# Patient Record
Sex: FEMALE | Race: WHITE | Employment: FULL TIME | URBAN - METROPOLITAN AREA
[De-identification: names, ages, dates, MRNs, and addresses within clinical notes are randomized per-mention and may not be internally consistent; named-entity substitution may affect disease eponyms.]

---

## 2022-08-08 ENCOUNTER — HOSPITAL ENCOUNTER (INPATIENT)
Age: 37
LOS: 3 days | Discharge: HOME | DRG: 776 | End: 2022-08-11
Attending: OBSTETRICS & GYNECOLOGY | Admitting: OBSTETRICS & GYNECOLOGY
Payer: COMMERCIAL

## 2022-08-08 DIAGNOSIS — Z79.4 TYPE 2 DIABETES MELLITUS WITHOUT COMPLICATION, WITH LONG-TERM CURRENT USE OF INSULIN (HCC): Primary | ICD-10-CM

## 2022-08-08 DIAGNOSIS — E11.9 TYPE 2 DIABETES MELLITUS WITHOUT COMPLICATION, WITH LONG-TERM CURRENT USE OF INSULIN (HCC): Primary | ICD-10-CM

## 2022-08-08 PROBLEM — L03.90 CELLULITIS: Status: ACTIVE | Noted: 2022-08-08

## 2022-08-08 LAB
ALBUMIN SERPL-MCNC: 3 G/DL (ref 3.5–5)
ALBUMIN/GLOB SERPL: 0.7 {RATIO} (ref 1.1–2.2)
ALP SERPL-CCNC: 169 U/L (ref 45–117)
ALT SERPL-CCNC: 45 U/L (ref 12–78)
ANION GAP SERPL CALC-SCNC: 7 MMOL/L (ref 5–15)
AST SERPL-CCNC: 26 U/L (ref 15–37)
BILIRUB SERPL-MCNC: 0.4 MG/DL (ref 0.2–1)
BUN SERPL-MCNC: 13 MG/DL (ref 6–20)
BUN/CREAT SERPL: 19 (ref 12–20)
CALCIUM SERPL-MCNC: 10.9 MG/DL (ref 8.5–10.1)
CHLORIDE SERPL-SCNC: 102 MMOL/L (ref 97–108)
CO2 SERPL-SCNC: 28 MMOL/L (ref 21–32)
CREAT SERPL-MCNC: 0.67 MG/DL (ref 0.55–1.02)
ERYTHROCYTE [DISTWIDTH] IN BLOOD BY AUTOMATED COUNT: 13.8 % (ref 11.5–14.5)
EST. AVERAGE GLUCOSE BLD GHB EST-MCNC: 134 MG/DL
GLOBULIN SER CALC-MCNC: 4.3 G/DL (ref 2–4)
GLUCOSE BLD STRIP.AUTO-MCNC: 126 MG/DL (ref 65–117)
GLUCOSE SERPL-MCNC: 124 MG/DL (ref 65–100)
HBA1C MFR BLD: 6.3 % (ref 4–5.6)
HCT VFR BLD AUTO: 41.6 % (ref 35–47)
HGB BLD-MCNC: 13.7 G/DL (ref 11.5–16)
MCH RBC QN AUTO: 30.9 PG (ref 26–34)
MCHC RBC AUTO-ENTMCNC: 32.9 G/DL (ref 30–36.5)
MCV RBC AUTO: 93.7 FL (ref 80–99)
NRBC # BLD: 0 K/UL (ref 0–0.01)
NRBC BLD-RTO: 0 PER 100 WBC
PLATELET # BLD AUTO: 443 K/UL (ref 150–400)
PMV BLD AUTO: 8.3 FL (ref 8.9–12.9)
POTASSIUM SERPL-SCNC: 4 MMOL/L (ref 3.5–5.1)
PROT SERPL-MCNC: 7.3 G/DL (ref 6.4–8.2)
RBC # BLD AUTO: 4.44 M/UL (ref 3.8–5.2)
SERVICE CMNT-IMP: ABNORMAL
SODIUM SERPL-SCNC: 137 MMOL/L (ref 136–145)
WBC # BLD AUTO: 8.1 K/UL (ref 3.6–11)

## 2022-08-08 PROCEDURE — 87186 SC STD MICRODIL/AGAR DIL: CPT

## 2022-08-08 PROCEDURE — 87185 SC STD ENZYME DETCJ PER NZM: CPT

## 2022-08-08 PROCEDURE — 87205 SMEAR GRAM STAIN: CPT

## 2022-08-08 PROCEDURE — 83036 HEMOGLOBIN GLYCOSYLATED A1C: CPT

## 2022-08-08 PROCEDURE — 65410000002 HC RM PRIVATE OB

## 2022-08-08 PROCEDURE — 80053 COMPREHEN METABOLIC PANEL: CPT

## 2022-08-08 PROCEDURE — 74011250637 HC RX REV CODE- 250/637: Performed by: INTERNAL MEDICINE

## 2022-08-08 PROCEDURE — 87077 CULTURE AEROBIC IDENTIFY: CPT

## 2022-08-08 PROCEDURE — 74011250637 HC RX REV CODE- 250/637: Performed by: OBSTETRICS & GYNECOLOGY

## 2022-08-08 PROCEDURE — 87176 TISSUE HOMOGENIZATION CULTR: CPT

## 2022-08-08 PROCEDURE — 36415 COLL VENOUS BLD VENIPUNCTURE: CPT

## 2022-08-08 PROCEDURE — 85027 COMPLETE CBC AUTOMATED: CPT

## 2022-08-08 PROCEDURE — 82962 GLUCOSE BLOOD TEST: CPT

## 2022-08-08 RX ORDER — FLUCONAZOLE 100 MG/1
400 TABLET ORAL EVERY 24 HOURS
Status: DISCONTINUED | OUTPATIENT
Start: 2022-08-08 | End: 2022-08-11 | Stop reason: HOSPADM

## 2022-08-08 RX ORDER — DIPHENHYDRAMINE HCL 25 MG
25 CAPSULE ORAL
Status: DISCONTINUED | OUTPATIENT
Start: 2022-08-08 | End: 2022-08-11 | Stop reason: HOSPADM

## 2022-08-08 RX ADMIN — FLUCONAZOLE 400 MG: 100 TABLET ORAL at 19:45

## 2022-08-08 RX ADMIN — DIPHENHYDRAMINE HYDROCHLORIDE 25 MG: 25 CAPSULE ORAL at 23:57

## 2022-08-08 RX ADMIN — DIPHENHYDRAMINE HYDROCHLORIDE 25 MG: 25 CAPSULE ORAL at 18:00

## 2022-08-08 NOTE — CONSULTS
Infectious Disease Consult    Today's Date: 2022   Admit Date: 2022    Impression:   Recent C section  Peroneal candidiasis  Possible 'dermatitid' reaction to fungal infection    Plan:   Higher dose fluconazole  Consider punch biopsy for histopathology  Symptomatic management    Anti-infectives:   Fluconazole    Subjective:   Date of Consultation:  2022  Referring Physician: Dr Claudia Carpenter    Patient is a 42 y.o. female who underwent  late last month. She did well from that standpoint, but developed some irritation at the site of the wound consistent with cutaneous candidiasis. She has subsequently developed maculopapular rash over breasts, back and abdomen. She is on no antibiotic therapy and we are asked to see her in consultation. There is no problem list on file for this patient. Past Medical History:   Diagnosis Date    Diabetes mellitus     IDDM (Type 2)    Liver disease     Fatty liver      No family history on file. Social History     Tobacco Use    Smoking status: Never    Smokeless tobacco: Not on file   Substance Use Topics    Alcohol use: No     Past Surgical History:   Procedure Laterality Date     DELIVERY ONLY      2011      Prior to Admission medications    Medication Sig Start Date End Date Taking? Authorizing Provider   PNV no.24-iron-folic acid-dha -996 mg-mcg-mg Cmpk Take  by mouth. Provider, Historical   insulin lispro (HUMALOG) 100 unit/mL injection 32 Units by SubCUTAneous route. Breakfast 32 units  Lunch 38 units  Dinner 36 units   Indications: DIABETES MELLITUS    Provider, Historical   insulin detemir (LEVEMIR) 100 unit/mL injection 32 Units by SubCUTAneous route nightly.  32 units at breakfast   Indications: TYPE 2 DIABETES MELLITUS    Provider, Historical       Allergies   Allergen Reactions    Ibuprofen Hives        Review of Systems:  A comprehensive review of systems was negative except for that written in the History of Present Illness. Objective:     Visit Vitals  BP (!) 126/92   Pulse (!) 102   Temp 98.3 °F (36.8 °C)   Resp 18   SpO2 95%     Temp (24hrs), Av.3 °F (36.8 °C), Min:98.3 °F (36.8 °C), Max:98.3 °F (36.8 °C)       Lines:  none     Physical Exam:  Lungs:  clear to auscultation bilaterally  Heart:  regular rate and rhythm  Abdomen:  soft, non-tender.  Bowel sounds normal. No masses,  no organomegaly  Skin:  rash noted on trunk, extremities, and along  site--chaperone present for exam    Data Review:     CBC:  Recent Labs     22  1704   WBC 8.1   HGB 13.7   HCT 41.6   *       BMP:  Recent Labs     22  1704   CREA 0.67   BUN 13      K 4.0      CO2 28   AGAP 7   *       LFTS:  Recent Labs     22  1704   TBILI 0.4   ALT 45   *   TP 7.3   ALB 3.0*       Microbiology:     All Micro Results       Procedure Component Value Units Date/Time    CULTURE, TISSUE Uzma Pucker STAIN [[id number]]     Order Status: Sent Specimen: Skin Lesion             Imaging:   None     Signed By: Handy Hilliard MD     2022

## 2022-08-08 NOTE — H&P
Gynecology History and Physical    Name: Sung Valenzuela MRN: [id number] SSN: [id number]    YOB: 1985  Age: 42 y.o. Sex: female       Subjective:      Chief complaint:  Diffuse rash s/p CS    Faiza Borja is a 42 y.o.  female s/p planned CS on . Postoperative course was uncomplicated and patient presented to office last Thursday to have TAWNYA dressing removed. At that time the patient was reporting inflammation and itching at the CS site the physician she saw suspected irritation to the adhesive on the dressing and a cutaneous yeast infection. She was prescribed daily diflucan and started on a medrol dose pack. Despite starting these medications the rash continued to spread and is now down inter her groin, on her breasts and on her face. Pt also noted to have rash in skin folds along her flank and she reports a possible lesion in her mouth. She subsequently noticed that her itching has been so bothersome around anything touching her skin that she stopped her insulin pump because she was noticing skin irritation there as well. Pt went to [facility] on Saturday and was prescribed Augmentin due to worsening symptoms but she has since stopped it. She is very itchy and has tried using dial soap. She reports she has felt chills but no obvious fever (she hasn't taken her temperature). She reports no drainage from her incision but does report that her skin underneath her pannus where the incision is does smell foul.           OB History          2    Para   1    Term                AB        Living   1         SAB        IAB        Ectopic        Molar        Multiple        Live Births                  Past Medical History:   Diagnosis Date    Diabetes mellitus     IDDM (Type 2)    Liver disease     Fatty liver     Past Surgical History:   Procedure Laterality Date     DELIVERY ONLY      2011     Social History     Occupational History    Not on file Tobacco Use    Smoking status: Never    Smokeless tobacco: Not on file   Substance and Sexual Activity    Alcohol use: No    Drug use: No    Sexual activity: Yes     Partners: Male     Birth control/protection: None     No family history on file. Allergies   Allergen Reactions    Ibuprofen Hives     Prior to Admission medications    Medication Sig Start Date End Date Taking? Authorizing Provider   PNV no.24-iron-folic acid-dha -476 mg-mcg-mg Cmpk Take  by mouth. Provider, Historical   insulin lispro (HUMALOG) 100 unit/mL injection 32 Units by SubCUTAneous route. Breakfast 32 units  Lunch 38 units  Dinner 36 units   Indications: DIABETES MELLITUS    Provider, Historical   insulin detemir (LEVEMIR) 100 unit/mL injection 32 Units by SubCUTAneous route nightly. 32 units at breakfast   Indications: TYPE 2 DIABETES MELLITUS    Provider, Historical        Review of Systems  A comprehensive review of systems was negative except for that written in the History of Present Illness. Objective:     Vitals:    22 1554   BP: (!) 126/92   Pulse: (!) 102   Resp: 18   Temp: 98.3 °F (36.8 °C)   SpO2: 95%       Physical Exam:  Face: nodular swelling on L cheek with rash  Breast: Diffuse macular rash on both breasts  Back: Flank rash in skin fold  Abdomen: Diffuse rash that appears to be extending from incision with sloughing above and below incision. Incision intact. Appears to have somewhat similar of distribution to where tape would have been initially but now extending beyond. External Genitalia: Rash extending into inguinal fold    Assessment/Plan:     Active Problems:    * No active hospital problems. *     Pt is a 41 yo  s/p CS on  now presenting with persistent and spreading rash with unclear etiology. - CBC, CMP, A1c  - AM/HS fingersticks.  Likely DTC consult in AM as I suspect she will need to be on basal insulin.  - Skin culture on abdomen near CS scar  - Benadryl for symptom management -- this has worked well at home. - Will hold additional fluconazole, steroids, antibiotics initially  - Will get IM/ID consult to help guide management at this point  - Continue breastfeeding  - Further management after receipt of recommendations from consultants.

## 2022-08-09 LAB
ALBUMIN SERPL-MCNC: 3 G/DL (ref 3.5–5)
ALBUMIN/GLOB SERPL: 0.7 {RATIO} (ref 1.1–2.2)
ALP SERPL-CCNC: 162 U/L (ref 45–117)
ALT SERPL-CCNC: 54 U/L (ref 12–78)
ANION GAP SERPL CALC-SCNC: 6 MMOL/L (ref 5–15)
AST SERPL-CCNC: 41 U/L (ref 15–37)
BASOPHILS # BLD: 0 K/UL (ref 0–0.1)
BASOPHILS NFR BLD: 1 % (ref 0–1)
BILIRUB SERPL-MCNC: 0.5 MG/DL (ref 0.2–1)
BUN SERPL-MCNC: 12 MG/DL (ref 6–20)
BUN/CREAT SERPL: 18 (ref 12–20)
CALCIUM SERPL-MCNC: 9.9 MG/DL (ref 8.5–10.1)
CHLORIDE SERPL-SCNC: 104 MMOL/L (ref 97–108)
CO2 SERPL-SCNC: 26 MMOL/L (ref 21–32)
COMMENT, HOLDF: NORMAL
CREAT SERPL-MCNC: 0.65 MG/DL (ref 0.55–1.02)
CRP SERPL HS-MCNC: 2.4 MG/L
DIFFERENTIAL METHOD BLD: ABNORMAL
EOSINOPHIL # BLD: 0.2 K/UL (ref 0–0.4)
EOSINOPHIL NFR BLD: 3 % (ref 0–7)
ERYTHROCYTE [DISTWIDTH] IN BLOOD BY AUTOMATED COUNT: 13.9 % (ref 11.5–14.5)
EST. AVERAGE GLUCOSE BLD GHB EST-MCNC: 134 MG/DL
GLOBULIN SER CALC-MCNC: 4.1 G/DL (ref 2–4)
GLUCOSE BLD STRIP.AUTO-MCNC: 105 MG/DL (ref 65–117)
GLUCOSE BLD STRIP.AUTO-MCNC: 113 MG/DL (ref 65–117)
GLUCOSE BLD STRIP.AUTO-MCNC: 118 MG/DL (ref 65–117)
GLUCOSE BLD STRIP.AUTO-MCNC: 131 MG/DL (ref 65–117)
GLUCOSE SERPL-MCNC: 131 MG/DL (ref 65–100)
HBA1C MFR BLD: 6.3 % (ref 4–5.6)
HCT VFR BLD AUTO: 42 % (ref 35–47)
HGB BLD-MCNC: 13.6 G/DL (ref 11.5–16)
IMM GRANULOCYTES # BLD AUTO: 0 K/UL (ref 0–0.04)
IMM GRANULOCYTES NFR BLD AUTO: 0 % (ref 0–0.5)
LYMPHOCYTES # BLD: 3.3 K/UL (ref 0.8–3.5)
LYMPHOCYTES NFR BLD: 44 % (ref 12–49)
MAGNESIUM SERPL-MCNC: 2 MG/DL (ref 1.6–2.4)
MCH RBC QN AUTO: 30.4 PG (ref 26–34)
MCHC RBC AUTO-ENTMCNC: 32.4 G/DL (ref 30–36.5)
MCV RBC AUTO: 94 FL (ref 80–99)
MONOCYTES # BLD: 0.3 K/UL (ref 0–1)
MONOCYTES NFR BLD: 4 % (ref 5–13)
NEUTS SEG # BLD: 3.6 K/UL (ref 1.8–8)
NEUTS SEG NFR BLD: 48 % (ref 32–75)
NRBC # BLD: 0 K/UL (ref 0–0.01)
NRBC BLD-RTO: 0 PER 100 WBC
PLATELET # BLD AUTO: 408 K/UL (ref 150–400)
PMV BLD AUTO: 8.2 FL (ref 8.9–12.9)
POTASSIUM SERPL-SCNC: 4 MMOL/L (ref 3.5–5.1)
PROT SERPL-MCNC: 7.1 G/DL (ref 6.4–8.2)
RBC # BLD AUTO: 4.47 M/UL (ref 3.8–5.2)
SAMPLES BEING HELD,HOLD: NORMAL
SERVICE CMNT-IMP: ABNORMAL
SERVICE CMNT-IMP: ABNORMAL
SERVICE CMNT-IMP: NORMAL
SERVICE CMNT-IMP: NORMAL
SODIUM SERPL-SCNC: 136 MMOL/L (ref 136–145)
WBC # BLD AUTO: 7.5 K/UL (ref 3.6–11)

## 2022-08-09 PROCEDURE — 74011250636 HC RX REV CODE- 250/636: Performed by: NURSE PRACTITIONER

## 2022-08-09 PROCEDURE — 86141 C-REACTIVE PROTEIN HS: CPT

## 2022-08-09 PROCEDURE — 83036 HEMOGLOBIN GLYCOSYLATED A1C: CPT

## 2022-08-09 PROCEDURE — 80053 COMPREHEN METABOLIC PANEL: CPT

## 2022-08-09 PROCEDURE — 74011000250 HC RX REV CODE- 250: Performed by: NURSE PRACTITIONER

## 2022-08-09 PROCEDURE — 65410000002 HC RM PRIVATE OB

## 2022-08-09 PROCEDURE — 85025 COMPLETE CBC W/AUTO DIFF WBC: CPT

## 2022-08-09 PROCEDURE — 82962 GLUCOSE BLOOD TEST: CPT

## 2022-08-09 PROCEDURE — 74011250637 HC RX REV CODE- 250/637: Performed by: OBSTETRICS & GYNECOLOGY

## 2022-08-09 PROCEDURE — 36415 COLL VENOUS BLD VENIPUNCTURE: CPT

## 2022-08-09 PROCEDURE — 74011250637 HC RX REV CODE- 250/637: Performed by: INTERNAL MEDICINE

## 2022-08-09 PROCEDURE — 83735 ASSAY OF MAGNESIUM: CPT

## 2022-08-09 PROCEDURE — 99233 SBSQ HOSP IP/OBS HIGH 50: CPT | Performed by: CLINICAL NURSE SPECIALIST

## 2022-08-09 RX ORDER — ACETAMINOPHEN 325 MG/1
650 TABLET ORAL
Status: DISCONTINUED | OUTPATIENT
Start: 2022-08-09 | End: 2022-08-11 | Stop reason: HOSPADM

## 2022-08-09 RX ORDER — MAGNESIUM SULFATE 100 %
4 CRYSTALS MISCELLANEOUS AS NEEDED
Status: DISCONTINUED | OUTPATIENT
Start: 2022-08-09 | End: 2022-08-11 | Stop reason: HOSPADM

## 2022-08-09 RX ORDER — INSULIN LISPRO 100 [IU]/ML
INJECTION, SOLUTION INTRAVENOUS; SUBCUTANEOUS
Status: DISCONTINUED | OUTPATIENT
Start: 2022-08-09 | End: 2022-08-11 | Stop reason: HOSPADM

## 2022-08-09 RX ADMIN — DIPHENHYDRAMINE HYDROCHLORIDE 25 MG: 25 CAPSULE ORAL at 06:39

## 2022-08-09 RX ADMIN — DIPHENHYDRAMINE HYDROCHLORIDE 25 MG: 25 CAPSULE ORAL at 18:49

## 2022-08-09 RX ADMIN — DIPHENHYDRAMINE HYDROCHLORIDE 25 MG: 25 CAPSULE ORAL at 12:24

## 2022-08-09 RX ADMIN — FLUCONAZOLE 400 MG: 100 TABLET ORAL at 18:49

## 2022-08-09 RX ADMIN — CEFEPIME 2 G: 2 INJECTION, POWDER, FOR SOLUTION INTRAVENOUS at 15:05

## 2022-08-09 NOTE — CONSULTS
[id number] [address]      Date of admission: 2022    Patient name: Car Lopez  MRN: [id number]  YOB: 1985  Age: 42 y.o. Primary care provider:  Faith Villafana MD     Source of Information: patient, ED and electronic medical records                              Chief complaint: rash    History of present illness  Car Lopez is a 42 y.o. female with past medical history of type 2 diabetes mellitus, fatty liver, s/p recent  section with delivery of baby boy presented for admission today with diffuse rash. Patient is s/p  on 2022. Patient was discharged home and later developed rash at surgical wound site. Patient was seen for follow up on 2022 and adhesive/ TAWNYA dressing was removed. Patient was noted to have diffuse rash spreading from lower abdomen/ wound to groin. She was thought to have cutaneous yeast infection. Patient reports that entire areas under abdominal pannus is wet but wound notedly is healing without apparent drainage. She was prescribed Medrol dose pack and Fluconazole po. She was seen on 2022 at [facility] urgent care center with as rash spread from lower abdomen/ wound site/ to groin, upper thigh, breast, left side of face, left side of arm with associated mouth sore. On arrival at the hospital for admission to the OB/GYN service, patient was started on Benadryl 25 mg po q 6 hours and Diflucan 400 mg po. Patient has been seen by infectious disease specialist in consultation with recommendations to increase dose of Diflucan, consider for punch biopsy, and continue supportive cares. Patient complains of itchy (generalized) but slightly improved with Benadryl. She also notes that she is now having rash/ reaction at any site where she had adhesive present, such as continuous glucose monitor on left arm.   Patient already removed the CGM adhesive. She also notes that her right thigh colored tattoo is becoming indurated for the first time. She reports no history of autoimmune, similar reaction or allergies in the past.  On entry to the room, patient was breast feeding and female nurse chaperon was present during my entire patient evaluation. Past Medical History:   Diagnosis Date    Diabetes mellitus     IDDM (Type 2)    Liver disease     Fatty liver      Past Surgical History:   Procedure Laterality Date     DELIVERY ONLY      2011     Prior to Admission medications    Medication Sig Start Date End Date Taking? Authorizing Provider   PNV no.24-iron-folic acid-dha -007 mg-mcg-mg Cmpk Take  by mouth. Provider, Historical   insulin lispro (HUMALOG) 100 unit/mL injection 32 Units by SubCUTAneous route. Breakfast 32 units  Lunch 38 units  Dinner 36 units   Indications: DIABETES MELLITUS    Provider, Historical   insulin detemir (LEVEMIR) 100 unit/mL injection 32 Units by SubCUTAneous route nightly. 32 units at breakfast   Indications: TYPE 2 DIABETES MELLITUS    Provider, Historical     Allergies   Allergen Reactions    Ibuprofen Hives         Social history  Patient resides  x  Independently                Ambulates  x  Independently                 Alcohol history   x  None           Smoking history  x  None             FAMILY HISTORY:  Thyroid cancer           Review of systems  Pertinent positives as noted in HPI. All other systems were reviewed and were negative     Physical Examination     (Examination was performed with female nurse chaperon present during the entire patient evaluation.)    Visit Vitals  /70 (BP 1 Location: Left upper arm, BP Patient Position: At rest)   Pulse 94   Temp 98 °F (36.7 °C)   Resp 16   SpO2 94%          O2 Device: None (Room air)    General:  Obese female in no acute respiratory distress.    Head:  Normocephalic, without obvious abnormality, atraumatic   Eyes:  Conjunctivae/corneas clear. Pupils 2 mm reactive bilateral   E/N/M/T: Nares normal. Septum midline. No nasal drainage or sinus tenderness  Tongue midline/ non-edematous  Clear oropharynx   Neck: Normal appearance and movements, symmetrical, trachea midline  No palpable adenopathy  No thyroid enlargement, tenderness or nodules  No carotid bruit   No JVD  Trachea midline   Lungs:   Symmetrical chest expansion and respiratory effort  Clear to auscultation bilaterally   Chest wall:  No tenderness or deformity   Heart:  Regular rate and rhythm   Normal S1 and S2; no murmur, click, rub or gallop   Abdomen:   Soft, no tenderness  No rebound, guarding, or rigidity  Non-distended   Bowel sounds normal  No masses or hepatosplenomegaly  No hernias present   Back: No costovertebral angle tenderness  No step-off deformity   Extremities: Extremities normal, atraumatic  No cyanosis or edema     Vascular/  Pulses: 2+ radial/ DP bilateral pulses   Integument/  Skin: Diffuse, erythematous rash involving entire bilateral lower quadrant, abdominal pannus (including  wound site which is intact without active drainage), bilateral upper thighs (anterior), groin, perineum, both breasts, inframammary folds, left side of face, left upper outer arm, and left volar wrist.  Areas of abdominal pannus is very wet and skin macerated with some skin peeling/ sloughing   Musculo-      skeletal: Gait not tested  Normal symmetry, ROM, strength and tone  No calf tenderness   Neuro: GCS 15. Moves all extremities x 4. No slurred speech. No facial droop. Sensation grossly intact.      Psych: Alert, oriented x 3           I reviewed the following data:      24 Hour Results:  Recent Results (from the past 24 hour(s))   CBC W/O DIFF    Collection Time: 22  5:04 PM   Result Value Ref Range    WBC 8.1 3.6 - 11.0 K/uL    RBC 4.44 3.80 - 5.20 M/uL    HGB 13.7 11.5 - 16.0 g/dL    HCT 41.6 35.0 - 47.0 %    MCV 93.7 80.0 - 99.0 FL    MCH 30.9 26.0 - 34.0 PG    MCHC 32.9 30.0 - 36.5 g/dL    RDW 13.8 11.5 - 14.5 %    PLATELET 338 (H) 853 - 400 K/uL    MPV 8.3 (L) 8.9 - 12.9 FL    NRBC 0.0 0  WBC    ABSOLUTE NRBC 0.00 0.00 - 1.32 K/uL   METABOLIC PANEL, COMPREHENSIVE    Collection Time: 22  5:04 PM   Result Value Ref Range    Sodium 137 136 - 145 mmol/L    Potassium 4.0 3.5 - 5.1 mmol/L    Chloride 102 97 - 108 mmol/L    CO2 28 21 - 32 mmol/L    Anion gap 7 5 - 15 mmol/L    Glucose 124 (H) 65 - 100 mg/dL    BUN 13 6 - 20 MG/DL    Creatinine 0.67 0.55 - 1.02 MG/DL    BUN/Creatinine ratio 19 12 - 20      GFR est AA >60 >60 ml/min/1.73m2    GFR est non-AA >60 >60 ml/min/1.73m2    Calcium 10.9 (H) 8.5 - 10.1 MG/DL    Bilirubin, total 0.4 0.2 - 1.0 MG/DL    ALT (SGPT) 45 12 - 78 U/L    AST (SGOT) 26 15 - 37 U/L    Alk.  phosphatase 169 (H) 45 - 117 U/L    Protein, total 7.3 6.4 - 8.2 g/dL    Albumin 3.0 (L) 3.5 - 5.0 g/dL    Globulin 4.3 (H) 2.0 - 4.0 g/dL    A-G Ratio 0.7 (L) 1.1 - 2.2     HEMOGLOBIN A1C WITH EAG    Collection Time: 22  5:04 PM   Result Value Ref Range    Hemoglobin A1c 6.3 (H) 4.0 - 5.6 %    Est. average glucose 134 mg/dL     Recent Labs     22  1704   WBC 8.1   HGB 13.7   HCT 41.6   *     Recent Labs     22  1704      K 4.0      CO2 28   *   BUN 13   CREA 0.67   CA 10.9*   ALB 3.0*   TBILI 0.4   ALT 45           Assessment and Plan     Rash  -Diffuse, erythematous rash involving entire bilateral lower quadrant, abdominal pannus (including  wound site which is intact without active drainage), bilateral upper thighs (anterior), groin, perineum, both breasts, inframammary folds, left side of face, left upper outer arm, and left volar wrist.  -concerning for hyper-inflammatory systemic reactions as not localized to areas of adhesive from TAWNYA dressing  -also starting to have induration of even right anterior thigh old tattoo  -continue recommendations as per ID with Fluconazole  -has some skin peeling/ sloughing within abdominal pannus but cannot diagnose more severe skin reaction such as Mack Hamburger  -agree with skin biopsy  -consider for transfer to tertiary center with dermatologist availability if rash worsens or not improved  -monitor for fever, leukocytosis, or oral/ mucosal involvement  -concern for possible secondary cellulitis but I defer to ID specialist expert consultation  -continue Benadryl 25 mg po q 6 hours for itching    2. Candidiasis  -plan as above    3. Type 2 diabetes mellitus  -Order Humalog insulin correctional coverage, scheduled blood glucose checks and check hemoglobin A1c level.   -insulin pump and continuous glucose monitor currently off    4.  Hypercalcemia  -repeat calcium and albumin level in a.m.  -encourage hydration and / or insert IV line and start IV fluids     VTE prophylaxis  -as per OB/GYN attending service s/p            Signed by: Aj Pond MD    2022 at 10:24 PM

## 2022-08-09 NOTE — DIABETES MGMT
[address] NURSE SPECIALIST CONSULT     Initial Presentation   Reji Yanez is a 42 y.o. female admitted s/p uncomplicated   delivery 22. Returned to post op check with OB and c/o itching around incision site with rash. .She was seen on 2022 at [facility] urgent care center with as rash spread from lower abdomen/ wound site/ to groin, upper thigh, breast, left side of face, left side of arm with associated mouth sore-Patient has been seen by infectious disease specialist in consultation with recommendations to increase dose of Diflucan, consider for punch biopsy, and continue supportive cares. Patient complains of itchy (generalized) but slightly improved with Benadryl. She also notes that she is now having rash/ reaction at any site where she had adhesive present, such as continuous glucose monitor on left arm. Patient already removed the CGM adhesive. She also notes that her right thigh colored tattoo is becoming indurated for the first time. She reports no history of autoimmune, similar reaction or allergies in the past.    HX:   Past Medical History:   Diagnosis Date    Diabetes mellitus     IDDM (Type 2)    Liver disease     Fatty liver        INITIAL DX:   Cellulitis [L03.90]     Current Treatment     TX: benedryl/ diflucan / ID consult-    Consulted by Provider for advanced diabetes nursing assessment and care for:     [x] Inpatient management strategy    Hospital Course   Clinical progress has been complicated by generalized rash s/p  delivery. : ID: Possible 'dermatitid' reaction to fungal infection; ? Rec for punch biopsy  Diabetes History   I9C-G2E 6.3-is likely not accurate this soon post partum- PTA uses insulin pump to manage T2D.    Followed by Dr. Lela Duncan- patient reported pre preg A1C >11%/ in January A1C -5.4%- 6.5% (2022)  Diabetes-related Medical History  Other associated conditions     Obesity/ fatty liver    Diabetes Medication History  Key Antihyperglycemic Medications               insulin lispro (HUMALOG) 100 unit/mL injection 32 Units by SubCUTAneous route. Breakfast 32 units  Lunch 38 units  Dinner 36 units   Indications: DIABETES MELLITUS    insulin detemir (LEVEMIR) 100 unit/mL injection 32 Units by SubCUTAneous route nightly. 32 units at breakfast   Indications: TYPE 2 DIABETES MELLITUS             Diabetes self-management practices:   Eating pattern   [x] Eating a carbohydrate-controlled mealplan    Physical activity pattern-post-partum recently     Monitoring pattern- PTA FreeStyle Libre2- now off due to rash  Post partum she noted \"going low\"- 70-80s and felt low at those numbers/ post meals 100-120    Taking medications pattern-insulin pump- :\"MD just changed settings since I delivered and I do not have my insulin pump on me \"   [x] Consistent administration  [x] Affordable  Social determinants of health impacting diabetes self-management practices   Concerned that you need to know more about how to stay healthy with diabetes  Overall evaluation:    [x] Achieving A1c target with drug therapy & self-care practices    Subjective   \" I am doing ok, some better\"  Noted documented rash    Post partum-\" I had a big baby, 12lbs\"     Objective   Physical exam  General Obese  female in no acute distress. Conversant and cooperative  Neuro  Alert, oriented   Vital Signs Visit Vitals  /75 (BP 1 Location: Left upper arm, BP Patient Position: At rest)   Pulse 75   Temp 97.7 °F (36.5 °C)   Resp 16   Ht 5' 2\" (1.575 m)   Wt 88.9 kg (196 lb)   SpO2 98%   BMI 35.85 kg/m²     Skin  Warm and dry. Noted rash  Heart   Regular rate and rhythm.  No murmurs, rubs or gallops  Lungs  Clear to auscultation without rales or rhonchi  Extremities No foot wounds        Laboratory  Recent Labs     08/09/22  0647 08/08/22  1704   * 124*   AGAP 6 7   WBC 7.5 8.1   CREA 0.65 0.67   GFRNA >60 >60   AST 41* 26   ALT 54 45       Factors impacting BG management  Factor Dose Comments   Nutrition:  Standard meals     60 grams/meal      Infection Dermatitis/ vs. Cellulitis? Other:   Kidney function  Liver function     GFR >60  AST 41  ALT WNL      Blood glucose pattern      Significant diabetes-related events over the past 24-72 hours  T2D- post partum since 22  PTA on insulin pump and cGM- both discontinued while inpatient due to rash  BG trends since admission in target without need for insulin. Correctional insulin ordered    Assessment and Plan   Nursing Diagnosis Risk for unstable blood glucose pattern   Nursing Intervention Domain 5250 Decision-making Support   Nursing Interventions Examined current inpatient diabetes/blood glucose control   Explored factors facilitating and impeding inpatient management  Explored corrective strategies with patient and responsible inpatient provider   Informed patient of rational for insulin strategy while hospitalized     Nursing Diagnosis 41922 Ineffective Health Management   Nursing Intervention Domain 5250 Decision-makingSupport   Nursing Interventions Identified diabetes self-management practices impeding diabetes control  Discussed diabetes survival skills related to  Healthy Plate eating plan; given handouts  Role of physical activity in improving insulin sensitivity and action  Procedure for blood glucose monitoring & options for low-cost products available from [facility]   Medications plan at discharge     Evaluation   Paula Gambino is a 42yo  female admitted with generalized rash s/p  delivery- Rash started around  dressing and has spread over body, localizing in spots where tape or CGM/ insulin pump sites were. Patient took self off pump and CGM for this reason. Being treated with diflucan and benedryl. ID consulted and following. She is a T2D prior to pregnancy and had above target A1C 11%.   Verbalized that since she has delivered her insulin needs are greatly reduced and she has been having 'lows' of 70-80 at home for fasting BG. With that information in mind and current BG trends would continue with current correctional scale insulin until BG trends <180. Will place recs below for starting weight based basal insulin if needed. She is currently breasfeeding her son in room- lactation can increase risk of overnight hypoglycemia so will be conservative with dosing. Recommendations   IF fasting BG starts to trend >180, please consider starting recs below:  [x] Use of Subcutaneous Insulin Order set (1935)  Insulin Dosing Specific recommendation   START Basal                                      (Based on weight, BMI & GFR) [x]        0.2 units/kg/D= 20 units Lantus QAM       CONTINUE Corrective                                       (Useful in adjusting insulin dosing) [x] Normal sensitivity        2. Diet modified to include carb consistent, 60gm    Billing Code(s)   09792    Before making these care recommendations, I personally reviewed the hospitalization record, including notes, laboratory & diagnostic data and current medications, and examined the patient at the bedside (circumstances permitting) before making care recommendations. More than fifty (50) percent of the time was spent in patient counseling and/or care coordination.   Total minutes: [address] MILA Rain  Diabetes Clinical Nurse Specialist  [facility]  Access via [address] [place] [address]

## 2022-08-09 NOTE — PROGRESS NOTES
ID Progress Note  2022    Subjective:     C/o itching  Review of Systems:            Symptom Y/N Comments   Symptom Y/N Comments   Fever/Chills  n     Chest Pain n       Poor Appetite       Edema        Cough       Abdominal Pain        Sputum       Joint Pain        SOB/RUSS       Pruritis/Rash y       Nausea/vomit n      Tolerating PT/OT        Diarrhea       Tolerating Diet        Constipation       Other           Could NOT obtain due to:       Objective:     Vitals: Visit Vitals  /75 (BP 1 Location: Left upper arm, BP Patient Position: At rest)   Pulse 75   Temp 97.7 °F (36.5 °C)   Resp 16   SpO2 98%        Tmax:  Temp (24hrs), Av °F (36.7 °C), Min:97.7 °F (36.5 °C), Max:98.3 °F (36.8 °C)      PHYSICAL EXAM:  General: Obese,WD, WN. Alert, cooperative, no acute distress    EENT:  EOMI. Anicteric sclerae. MMM  Resp:  CTA bilaterally, no wheezing or rales. No accessory muscle use  CV:  Regular  rhythm,  No edema  GI:  Soft, Non distended, Non tender. +Bowel sounds  Neurologic:  Alert and oriented X 3, normal speech,   Psych:   Good insight. Not anxious nor agitated  Skin:  Diffuse maculopapular rashes over breasts, and skin folds. No jaundice    Labs:   Lab Results   Component Value Date/Time    WBC 7.5 2022 06:47 AM    HGB 13.6 2022 06:47 AM    HCT 42.0 2022 06:47 AM    PLATELET 371 (H)  06:47 AM    MCV 94.0 2022 06:47 AM     Lab Results   Component Value Date/Time    Sodium 136 2022 06:47 AM    Potassium 4.0 2022 06:47 AM    Chloride 104 2022 06:47 AM    CO2 26 2022 06:47 AM    Anion gap 6 2022 06:47 AM    Glucose 131 (H) 2022 06:47 AM    BUN 12 2022 06:47 AM    Creatinine 0.65 2022 06:47 AM    BUN/Creatinine ratio 18 2022 06:47 AM    GFR est AA >60 2022 06:47 AM    GFR est non-AA >60 2022 06:47 AM    Calcium 9.9 2022 06:47 AM    Bilirubin, total 0.5 2022 06:47 AM    Alk.  phosphatase 162 (H) 08/09/2022 06:47 AM    Protein, total 7.1 08/09/2022 06:47 AM    Albumin 3.0 (L) 08/09/2022 06:47 AM    Globulin 4.1 (H) 08/09/2022 06:47 AM    A-G Ratio 0.7 (L) 08/09/2022 06:47 AM    ALT (SGPT) 54 08/09/2022 06:47 AM         Cultures:   Results       Procedure Component Value Units Date/Time    CULTURE, TISSUE Mliss Morena STAIN [[id number]]  (Abnormal) Collected: 08/08/22 2008    Order Status: Completed Specimen: Skin Lesion Updated: 08/09/22 1303     Special Requests: NO SPECIAL REQUESTS        GRAM STAIN RARE WBCS SEEN         3+ GRAM NEGATIVE RODS               2+ GRAM POSITIVE COCCI IN PAIRS           Culture result: LIGHT GRAM NEGATIVE RODS               CHECKING FOR POSSIBLE HEAVY MIXED SKIN VICKEY ISOLATED                   Impression:   Recent C section  Peroneal candidiasis  Possible 'dermatitid' reaction to fungal infection        wound cx (8/8) GNR; incision is intact, healing, no open area. Pt reported some drainage, but she thinks it may be from between skin folds, malodorous.  GNR may be contaminant organism  Plan:   Continue with fluconazole  Skin care per wound care team  Consider punch biopsy for histopathology  Symptomatic management       Above plan of care discussed and agreed with Dr. Meche Fang, NP

## 2022-08-09 NOTE — WOUND CARE
WOCN Note:     New consult placed for assessment of diffuse rash. Recent CS deliver of baby boy on 7.28.22. Patient reports that the rash started initially around her TAWNYA CS incisional VAC and then spread. The rash was determined to be Candidiasis and she was treated with diflucan and a medrol dose pack. The rash is malodorous and has a maculopapular presentation consistent with Candidiasis. It has also spread to her breasts, face, arms and groin/thighs. ID has increased the diflucan dosage. Benadryl is also in use for itching. Chart reviewed. Assessed in room 317. Admitted DX:  Cellulitis   Past Medical History:   Diagnosis Date    Diabetes mellitus     IDDM (Type 2)    Liver disease     Fatty liver     Assessment:   Patient is A&O x 4, communicative, continent and mobile. Bed: p500 air mattress  Patient reports no pain. 1. Abdominal fold, Red moist malodorous rash consistent with Candidiasis. 2.  Breast folds, breast, arms, face and groin, red itchy rash etiology not determined. Wound, Pressure Prevention & Skin Care Recommendations:    Minimize layers of linen/pads under patient to optimize support surface. 2.  Turn/reposition approximately every 2 hours and offload heels. 3.  Manage moisture/ Keep skin folds clean and dry/minimize brief usage. 4.  Abdominal folds and breast folds:  Cleanse and pat dry, apply Silver foam.  Change as needed and every 7 days. Discussed with patient and RN. Transition of Care:   Plan to follow routinely.     CARSON Chen RN [facility]  Available on [employer]  Office [phone]

## 2022-08-09 NOTE — PROGRESS NOTES
Bedside and Verbal shift change report given to AKL Bhatti (oncoming nurse) by Kate Tian RN (offgoing nurse). Report included the following information SBAR, Kardex, ED Summary, Procedure Summary, Intake/Output, MAR, Accordion, Recent Results, and Med Rec Status.

## 2022-08-09 NOTE — PROGRESS NOTES
[address] [facility] Adult  Hospitalist Group                                                                                          Hospitalist Progress Note  Alirio Pinzon MD  Answering service: [phone] from in house phone        Date of Service:  2022  NAME:  Raquel Simeon  :  1985  MRN:  [id number]      Admission Summary:   Raquel Simeon is a 42 y.o. female with past medical history of type 2 diabetes mellitus, fatty liver, s/p recent  section with delivery of baby boy presented for admission today with diffuse rash. Patient is s/p  on 2022. Patient was discharged home and later developed rash at surgical wound site. Patient was seen for follow up on 2022 and adhesive/ TAWNYA dressing was removed. Patient was noted to have diffuse rash spreading from lower abdomen/ wound to groin. She was thought to have cutaneous yeast infection. Patient reports that entire areas under abdominal pannus is wet but wound notedly is healing without apparent drainage. She was prescribed Medrol dose pack and Fluconazole po. She was seen on 2022 at [facility] urgent care center with as rash spread from lower abdomen/ wound site/ to groin, upper thigh, breast, left side of face, left side of arm with associated mouth sore. On arrival at the hospital for admission to the OB/GYN service, patient was started on Benadryl 25 mg po q 6 hours and Diflucan 400 mg po. Patient has been seen by infectious disease specialist in consultation with recommendations to increase dose of Diflucan, consider for punch biopsy, and continue supportive cares. Patient complains of itchy (generalized) but slightly improved with Benadryl. She also notes that she is now having rash/ reaction at any site where she had adhesive present, such as continuous glucose monitor on left arm. Patient already removed the CGM adhesive.   She also notes that her right thigh colored tattoo is becoming indurated for the first time.   She reports no history of autoimmune, similar reaction or allergies in the past.  On entry to the room, patient was breast feeding and female nurse chaperon was present during my entire patient evaluation       Interval history / Subjective:   NAD, no acute event over night  Skin rush not improving much  No fever, no diarrhea, able to tolerate PO  Breast feeding child  Brother at bed side  Id input appreciated  Blood glucose is well controlled     Assessment & Plan:      Rash  -Diffuse, erythematous rash involving entire bilateral lower quadrant, abdominal pannus (including  wound site which is intact without active drainage), bilateral upper thighs (anterior), groin, perineum, both breasts, inframammary folds, left side of face, left upper outer arm, and left volar wrist.  -concerning for hyper-inflammatory systemic reactions as not localized to areas of adhesive from TAWNYA dressing  -also starting to have induration of even right anterior thigh old tattoo  -continue recommendations as per ID with Fluconazole  -has some skin peeling/ sloughing within abdominal pannus but cannot diagnose more severe skin reaction such as Arvella Lose  -agree with skin biopsy  -consider for transfer to tertiary center with dermatologist availability if rash worsens or not improved  -monitor for fever, leukocytosis, or oral/ mucosal involvement  -concern for possible secondary cellulitis but I defer to ID specialist expert consultation  -continue Benadryl 25 mg po q 6 hours for itching     Candidiasis  -plan as above     Diabetes mellitus type 2  -Humalog insulin correctional coverage, scheduled blood glucose checks and check hemoglobin A1c level.   -continuous glucose monitor currently off     Hypercalcemia, resolved  -encourage hydration and / or insert IV line and start IV fluids       Code status: Full code  Prophylaxis: ambulatory  Care Plan discussed with: patient and RN  Anticipated Disposition: per OBGYN, follow up ID recommendations     Hospital Problems  Never Reviewed            Codes Class Noted POA    Cellulitis ICD-10-CM: L03.90  ICD-9-CM: 682.9  8/8/2022 Unknown             Review of Systems:   Pertinent items are noted in HPI. Vital Signs:    Last 24hrs VS reviewed since prior progress note. Most recent are:  Visit Vitals  /75 (BP 1 Location: Left upper arm, BP Patient Position: At rest)   Pulse 75   Temp 97.7 °F (36.5 °C)   Resp 16   SpO2 98%       No intake or output data in the 24 hours ending 08/09/22 1319     Physical Examination:     I had a face to face encounter with this patient and independently examined them on 8/9/2022 as outlined below:          Constitutional:  No acute distress, cooperative, pleasant    ENT:  Oral mucosa moist, oropharynx benign. Resp:  CTA bilaterally. No wheezing/rhonchi/rales. No accessory muscle use. CV:  Regular rhythm, normal rate, no murmurs, gallops, rubs    GI:  Soft, non distended, non tender. normoactive bowel sounds, no hepatosplenomegaly     Musculoskeletal:  No edema, warm, 2+ pulses throughout    Neurologic:  Moves all extremities. AAOx3, CN II-XII reviewed  Rash involving upper chest, breast area, low abdomen, panus, groin            Data Review:    Review and/or order of tests in the radiology section of CPT      Labs:     Recent Labs     08/09/22  0647 08/08/22  1704   WBC 7.5 8.1   HGB 13.6 13.7   HCT 42.0 41.6   * 443*     Recent Labs     08/09/22  0647 08/08/22  1704    137   K 4.0 4.0    102   CO2 26 28   BUN 12 13   CREA 0.65 0.67   * 124*   CA 9.9 10.9*   MG 2.0  --      Recent Labs     08/09/22  0647 08/08/22  1704   ALT 54 45   * 169*   TBILI 0.5 0.4   TP 7.1 7.3   ALB 3.0* 3.0*   GLOB 4.1* 4.3*     No results for input(s): INR, PTP, APTT, INREXT in the last 72 hours. No results for input(s): FE, TIBC, PSAT, FERR in the last 72 hours.    No results found for: FOL, RBCF   No results for input(s): PH, PCO2, PO2 in the last 72 hours. No results for input(s): CPK, CKNDX, TROIQ in the last 72 hours.     No lab exists for component: CPKMB  No results found for: CHOL, CHOLX, CHLST, CHOLV, HDL, HDLP, LDL, LDLC, DLDLP, TGLX, TRIGL, TRIGP, CHHD, CHHDX  Lab Results   Component Value Date/Time    Glucose (POC) 105 08/09/2022 12:14 PM    Glucose (POC) 131 (H) 08/09/2022 08:36 AM    Glucose (POC) 126 (H) 08/08/2022 11:52 PM    Glucose (POC) 138 (H) 07/11/2013 09:56 PM     No results found for: COLOR, APPRN, SPGRU, REFSG, JANICE, PROTU, GLUCU, KETU, BILU, UROU, HAFSA, LEUKU, GLUKE, EPSU, BACTU, WBCU, RBCU, CASTS, UCRY      Medications Reviewed:     Current Facility-Administered Medications   Medication Dose Route Frequency    glucose chewable tablet 16 g  4 Tablet Oral PRN    glucagon (GLUCAGEN) injection 1 mg  1 mg IntraMUSCular PRN    dextrose 10 % infusion 0-250 mL  0-250 mL IntraVENous PRN    insulin lispro (HUMALOG) injection   SubCUTAneous AC&HS    acetaminophen (TYLENOL) tablet 650 mg  650 mg Oral Q4H PRN    diphenhydrAMINE (BENADRYL) capsule 25 mg  25 mg Oral Q6H PRN    fluconazole (DIFLUCAN) tablet 400 mg  400 mg Oral Q24H     ______________________________________________________________________  EXPECTED LENGTH OF STAY: - - -  ACTUAL LENGTH OF STAY:          1                 Jeni Edward MD

## 2022-08-09 NOTE — PROGRESS NOTES
Bedside and Verbal shift change report given to Chandni Lyman RN (oncoming nurse) by Kitty Segura RN (offgoing nurse). Report included the following information SBAR.       2220: Advised by hospitalist to use ABD pads and 4x4s to keep moist areas dry due to pt's rash.

## 2022-08-10 LAB
ALBUMIN SERPL-MCNC: 2.7 G/DL (ref 3.5–5)
ALBUMIN/GLOB SERPL: 0.7 {RATIO} (ref 1.1–2.2)
ALP SERPL-CCNC: 158 U/L (ref 45–117)
ALT SERPL-CCNC: 54 U/L (ref 12–78)
ANION GAP SERPL CALC-SCNC: 9 MMOL/L (ref 5–15)
AST SERPL-CCNC: 37 U/L (ref 15–37)
ATRIAL RATE: 83 BPM
BASOPHILS # BLD: 0 K/UL (ref 0–0.1)
BASOPHILS NFR BLD: 1 % (ref 0–1)
BILIRUB SERPL-MCNC: 0.4 MG/DL (ref 0.2–1)
BUN SERPL-MCNC: 17 MG/DL (ref 6–20)
BUN/CREAT SERPL: 24 (ref 12–20)
CALCIUM SERPL-MCNC: 9.2 MG/DL (ref 8.5–10.1)
CALCULATED P AXIS, ECG09: 39 DEGREES
CALCULATED R AXIS, ECG10: 5 DEGREES
CALCULATED T AXIS, ECG11: 22 DEGREES
CHLORIDE SERPL-SCNC: 105 MMOL/L (ref 97–108)
CO2 SERPL-SCNC: 20 MMOL/L (ref 21–32)
CREAT SERPL-MCNC: 0.7 MG/DL (ref 0.55–1.02)
DIAGNOSIS, 93000: NORMAL
DIFFERENTIAL METHOD BLD: ABNORMAL
EOSINOPHIL # BLD: 0.3 K/UL (ref 0–0.4)
EOSINOPHIL NFR BLD: 3 % (ref 0–7)
ERYTHROCYTE [DISTWIDTH] IN BLOOD BY AUTOMATED COUNT: 13.7 % (ref 11.5–14.5)
GLOBULIN SER CALC-MCNC: 4.1 G/DL (ref 2–4)
GLUCOSE BLD STRIP.AUTO-MCNC: 101 MG/DL (ref 65–117)
GLUCOSE BLD STRIP.AUTO-MCNC: 112 MG/DL (ref 65–117)
GLUCOSE BLD STRIP.AUTO-MCNC: 127 MG/DL (ref 65–117)
GLUCOSE BLD STRIP.AUTO-MCNC: 129 MG/DL (ref 65–117)
GLUCOSE SERPL-MCNC: 136 MG/DL (ref 65–100)
HCT VFR BLD AUTO: 43.2 % (ref 35–47)
HGB BLD-MCNC: 13.9 G/DL (ref 11.5–16)
IMM GRANULOCYTES # BLD AUTO: 0 K/UL (ref 0–0.04)
IMM GRANULOCYTES NFR BLD AUTO: 1 % (ref 0–0.5)
LYMPHOCYTES # BLD: 3.8 K/UL (ref 0.8–3.5)
LYMPHOCYTES NFR BLD: 45 % (ref 12–49)
MCH RBC QN AUTO: 31.4 PG (ref 26–34)
MCHC RBC AUTO-ENTMCNC: 32.2 G/DL (ref 30–36.5)
MCV RBC AUTO: 97.5 FL (ref 80–99)
MONOCYTES # BLD: 0.3 K/UL (ref 0–1)
MONOCYTES NFR BLD: 4 % (ref 5–13)
NEUTS SEG # BLD: 4 K/UL (ref 1.8–8)
NEUTS SEG NFR BLD: 46 % (ref 32–75)
NRBC # BLD: 0 K/UL (ref 0–0.01)
NRBC BLD-RTO: 0 PER 100 WBC
P-R INTERVAL, ECG05: 158 MS
PLATELET # BLD AUTO: 381 K/UL (ref 150–400)
PMV BLD AUTO: 8.2 FL (ref 8.9–12.9)
POTASSIUM SERPL-SCNC: 4.2 MMOL/L (ref 3.5–5.1)
PROT SERPL-MCNC: 6.8 G/DL (ref 6.4–8.2)
Q-T INTERVAL, ECG07: 360 MS
QRS DURATION, ECG06: 86 MS
QTC CALCULATION (BEZET), ECG08: 423 MS
RBC # BLD AUTO: 4.43 M/UL (ref 3.8–5.2)
SERVICE CMNT-IMP: ABNORMAL
SERVICE CMNT-IMP: ABNORMAL
SERVICE CMNT-IMP: NORMAL
SERVICE CMNT-IMP: NORMAL
SODIUM SERPL-SCNC: 134 MMOL/L (ref 136–145)
VENTRICULAR RATE, ECG03: 83 BPM
WBC # BLD AUTO: 8.4 K/UL (ref 3.6–11)

## 2022-08-10 PROCEDURE — 74011250637 HC RX REV CODE- 250/637: Performed by: OBSTETRICS & GYNECOLOGY

## 2022-08-10 PROCEDURE — 93005 ELECTROCARDIOGRAM TRACING: CPT

## 2022-08-10 PROCEDURE — 80053 COMPREHEN METABOLIC PANEL: CPT

## 2022-08-10 PROCEDURE — 85025 COMPLETE CBC W/AUTO DIFF WBC: CPT

## 2022-08-10 PROCEDURE — 82962 GLUCOSE BLOOD TEST: CPT

## 2022-08-10 PROCEDURE — 36415 COLL VENOUS BLD VENIPUNCTURE: CPT

## 2022-08-10 PROCEDURE — 74011250637 HC RX REV CODE- 250/637: Performed by: INTERNAL MEDICINE

## 2022-08-10 PROCEDURE — 65410000002 HC RM PRIVATE OB

## 2022-08-10 RX ADMIN — FLUCONAZOLE 400 MG: 100 TABLET ORAL at 19:39

## 2022-08-10 RX ADMIN — DIPHENHYDRAMINE HYDROCHLORIDE 25 MG: 25 CAPSULE ORAL at 06:57

## 2022-08-10 RX ADMIN — DIPHENHYDRAMINE HYDROCHLORIDE 25 MG: 25 CAPSULE ORAL at 21:35

## 2022-08-10 RX ADMIN — Medication: at 22:00

## 2022-08-10 RX ADMIN — ACETAMINOPHEN 650 MG: 325 TABLET ORAL at 16:44

## 2022-08-10 RX ADMIN — DIPHENHYDRAMINE HYDROCHLORIDE 25 MG: 25 CAPSULE ORAL at 13:36

## 2022-08-10 RX ADMIN — Medication: at 16:44

## 2022-08-10 RX ADMIN — Medication: at 08:29

## 2022-08-10 NOTE — PROGRESS NOTES
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 Patient is s/p  on 2022. Patient was discharged home and later developed rash at surgical wound site. Patient was seen for follow up on 2022 and adhesive/ TAWNYA dressing was removed. Patient was noted to have diffuse rash spreading from lower abdomen/ wound to groin. She was thought to have cutaneous yeast infection. Patient reports that entire areas under abdominal pannus is wet but wound notedly is healing without apparent drainage. She was prescribed Medrol dose pack and Fluconazole po. She was seen on 2022 at [facility] urgent care center with as rash spread from lower abdomen/ wound site/ to groin, upper thigh, breast, left side of face, left side of arm with associated mouth sore. On arrival at the hospital for admission to the OB/GYN service, patient was started on Benadryl 25 mg po q 6 hours and Diflucan 400 mg po. Patient has been seen by infectious disease specialist in consultation with recommendations to increase dose of Diflucan, consider for punch biopsy, and continue supportive cares. Patient complains of itchy (generalized) but slightly improved with Benadryl. She also notes that she is now having rash/ reaction at any site where she had adhesive present, such as continuous glucose monitor on left arm. Patient already removed the CGM adhesive.   She also notes that her right thigh colored tattoo is becoming indurated for the first time.   She reports no history of autoimmune, similar reaction or allergies in the past.  On entry to the room, patient was breast feeding and female nurse chaperon was present during my entire patient evaluation       Interval history / Subjective:   NAD, no acute event over night  Skin rush slight better redness   No fever, no diarrhea, able to tolerate PO  Brother at bed side  ID and OBGYN input appreciated  Blood glucose is well controlled   ECG reviewed QT WNLL  Assessment & Plan:      Rash  -Diffuse, erythematous rash involving entire bilateral lower quadrant, abdominal pannus (including  wound site which is intact without active drainage), bilateral upper thighs (anterior), groin, perineum, both breasts, inframammary folds, left side of face, left upper outer arm, and left volar wrist.  -concerning for hyper-inflammatory systemic reactions as not localized to areas of adhesive from TAWNYA dressing  -also starting to have induration of even right anterior thigh old tattoo  -continue recommendations as per ID with Fluconazole  -has some skin peeling/ sloughing within abdominal pannus but cannot diagnose more severe skin reaction such as Thierry How  -agree with skin biopsy  -consider for transfer to tertiary center with dermatologist availability if rash worsens or not improved  -monitor for fever, leukocytosis, or oral/ mucosal involvement  -concern for possible secondary cellulitis but I defer to ID specialist expert consultation  -continue Benadryl 25 mg po q 6 hours for itching     Candidiasis  -plan as above     Diabetes mellitus type 2  -Humalog insulin correctional coverage, scheduled blood glucose checks and check hemoglobin A1c level.   -continuous glucose monitor currently off     Hypercalcemia, resolved  -encourage hydration and / or insert IV line and start IV fluids       Code status: Full code  Prophylaxis: ambulatory  Care Plan discussed with: patient and RN  Anticipated Disposition: discharge per OBGYN, follow up ID recommendations     Hospital Problems  Never Reviewed            Codes Class Noted POA    Cellulitis ICD-10-CM: L03.90  ICD-9-CM: 682.9  8/8/2022 Unknown           Review of Systems:   Pertinent items are noted in HPI. Vital Signs:    Last 24hrs VS reviewed since prior progress note. Most recent are:  Visit Vitals  /70 (BP 1 Location: Right upper arm, BP Patient Position: At rest)   Pulse 87   Temp 98.3 °F (36.8 °C)   Resp 18   Ht 5' 2\" (1.575 m)   Wt 88.9 kg (196 lb)   SpO2 97%   BMI 35.85 kg/m²       No intake or output data in the 24 hours ending 08/10/22 1700     Physical Examination:     I had a face to face encounter with this patient and independently examined them on 8/10/2022 as outlined below:          Constitutional:  No acute distress, cooperative, pleasant    ENT:  Oral mucosa moist, oropharynx benign. Resp:  CTA bilaterally. No wheezing/rhonchi/rales. No accessory muscle use. CV:  Regular rhythm, normal rate, no murmurs, gallops, rubs    GI:  Soft, non distended, non tender. normoactive bowel sounds, no hepatosplenomegaly     Musculoskeletal:  No edema, warm, 2+ pulses throughout    Neurologic:  Moves all extremities.   AAOx3, CN II-XII reviewed  Rash/redness involving upper chest breast area, low abdomen, panus, groin, ome spots on left palm            Data Review:    Review and/or order of tests in the radiology section of CPT      Labs:     Recent Labs     08/10/22  0415 08/09/22  0647   WBC 8.4 7.5   HGB 13.9 13.6   HCT 43.2 42.0    408*       Recent Labs     08/10/22  0415 08/09/22  0647 08/08/22  1704   * 136 137   K 4.2 4.0 4.0    104 102   CO2 20* 26 28   BUN 17 12 13   CREA 0.70 0.65 0.67   * 131* 124*   CA 9.2 9.9 10.9*   MG  --  2.0  --        Recent Labs     08/10/22  0415 08/09/22  0647 08/08/22  1704   ALT 54 54 45   * 162* 169*   TBILI 0.4 0.5 0.4 TP 6.8 7.1 7.3   ALB 2.7* 3.0* 3.0*   GLOB 4.1* 4.1* 4.3*       No results for input(s): INR, PTP, APTT, INREXT, INREXT in the last 72 hours. No results for input(s): FE, TIBC, PSAT, FERR in the last 72 hours. No results found for: FOL, RBCF   No results for input(s): PH, PCO2, PO2 in the last 72 hours. No results for input(s): CPK, CKNDX, TROIQ in the last 72 hours.     No lab exists for component: CPKMB  No results found for: CHOL, CHOLX, CHLST, CHOLV, HDL, HDLP, LDL, LDLC, DLDLP, TGLX, TRIGL, TRIGP, CHHD, CHHDX  Lab Results   Component Value Date/Time    Glucose (POC) 101 08/10/2022 04:41 PM    Glucose (POC) 112 08/10/2022 12:15 PM    Glucose (POC) 127 (H) 08/10/2022 06:52 AM    Glucose (POC) 118 (H) 08/09/2022 10:12 PM    Glucose (POC) 113 08/09/2022 05:10 PM     No results found for: COLOR, APPRN, SPGRU, REFSG, JANICE, PROTU, GLUCU, KETU, BILU, UROU, HAFSA, LEUKU, GLUKE, EPSU, BACTU, WBCU, RBCU, CASTS, UCRY      Medications Reviewed:     Current Facility-Administered Medications   Medication Dose Route Frequency    glucose chewable tablet 16 g  4 Tablet Oral PRN    glucagon (GLUCAGEN) injection 1 mg  1 mg IntraMUSCular PRN    dextrose 10 % infusion 0-250 mL  0-250 mL IntraVENous PRN    insulin lispro (HUMALOG) injection   SubCUTAneous AC&HS    acetaminophen (TYLENOL) tablet 650 mg  650 mg Oral Q4H PRN    Nipple Ointment ([employer])BSR   Topical TID    diphenhydrAMINE (BENADRYL) capsule 25 mg  25 mg Oral Q6H PRN    fluconazole (DIFLUCAN) tablet 400 mg  400 mg Oral Q24H     ______________________________________________________________________  EXPECTED LENGTH OF STAY: 2d 12h  ACTUAL LENGTH OF STAY:          2                 Zuri Rosa MD

## 2022-08-10 NOTE — PROGRESS NOTES
Bedside and Verbal shift change report given to MIKE Grande RN/PATEL Rowland RN (oncoming nurse) by Fredy Bhakta RN (offgoing nurse). Report included the following information SBAR, Kardex, Intake/Output, MAR, and Recent Results.

## 2022-08-10 NOTE — WOUND CARE
WOCN Note:     Follow up visit with Layla MARIO NP. Recent CS deliver of baby boy on 7.28.22. Patient reports that the rash started initially around her TAWNYA CS incisional VAC and then spread. The rash was determined to be Candidiasis and she was treated with diflucan and a medrol dose pack. The rash is malodorous and has a maculopapular presentation consistent with Candidiasis. It has also spread to her breasts, face, arms and groin/thighs. ID has increased the diflucan dosage. Benadryl is also in use for itching. Chart reviewed. Assessed in room 317. Admitted DX:  Cellulitis        Past Medical History:   Diagnosis Date    Diabetes mellitus       IDDM (Type 2)    Liver disease       Fatty liver      Assessment:  Patient is A&O x 4, communicative, continent and mobile. Bed: p500 air mattress  Patient reports no pain. 1. Abdominal fold, Red moist malodorous rash consistent with Candidiasis. Using the silver foam.  Appears improved. 2.  Breast folds, breast, arms, face and groin, red itchy rash. Using silver foam.  Appears improved. Wound, Pressure Prevention & Skin Care Recommendations:    Minimize layers of linen/pads under patient to optimize support surface. 2.  Turn/reposition approximately every 2 hours and offload heels. 3.  Manage moisture/ Keep skin folds clean and dry/minimize brief usage. 4.  Abdominal folds and breast folds:  Cleanse and pat dry, apply Silver foam.  Change as needed and every 3-7 days. Discussed with patient and RN. Transition of Care:   Plan to follow routinely. Provided patient with supply of silver foam for home use.     PALOMA FerraroN RONALD [facility]  Available on [employer]  Office [phone]

## 2022-08-10 NOTE — PROGRESS NOTES
Gynecology Progress Note    Rachel Nur    Assesment:   41 yo  s/p CS on  admitted with worsening rash of unclear etiology. Rash: appreciate ID and medicine consults. Possible hypersensitivity reaction to yeast and possible overlying cellulitis of pannus.  -cont Diflucan per ID  -Benadryl prn itching  -antibiotics per ID, culture GNR, final culture pending  -appreciate wound care consult and wound care - silver foam in place     -rash stable/ continuing to improve today. Breastfeeding: add Kathia Perezri for nipple pain     Labs: AST slightly elevated today, ?med related. -AM labs     T2MD:  -appreciate diabetes management input - recs including of fastings >180.  -adequate control currently sliding scale        Subjective:  Patient reports that she's a little better today. Her itching is controlled with benadryl. The rash is less raised. The rash has not spread and appears to be flatter. She denies fevers or chills.         S  Orders/Charges: High    Vitals:  Visit Vitals  /71 (BP 1 Location: Right upper arm, BP Patient Position: At rest)   Pulse 89   Temp 97.6 °F (36.4 °C)   Resp 16   Ht 5' 2\" (1.575 m)   Wt 88.9 kg (196 lb)   SpO2 97%   BMI 35.85 kg/m²     Temp (24hrs), Av.9 °F (36.6 °C), Min:97.6 °F (36.4 °C), Max:98 °F (36.7 °C)      Last 24hr Input/Output:  No intake or output data in the 24 hours ending 08/10/22 1231       Exam:  General: alert, cooperative, no distress, appears stated age     Lung: clear to auscultation bilaterally     Heart: regular rate and rhythm, S1, S2 normal, no murmur, click, rub or gallop     Abdomen: abdomen is soft without significant tenderness, masses, organomegaly or guarding; skin rash is noted with silver dressings in place over rash; rash is over mons, inner thighs, abdomen and patchy areas over breasts; rash is erythematous though less raised and flatter than prior     Extremities: extremities normal, atraumatic, no cyanosis or edema      Labs:   Lab Results   Component Value Date/Time    WBC 8.4 08/10/2022 04:15 AM    WBC 7.5 08/09/2022 06:47 AM    WBC 8.1 08/08/2022 05:04 PM    HGB 13.9 08/10/2022 04:15 AM    HGB 13.6 08/09/2022 06:47 AM    HGB 13.7 08/08/2022 05:04 PM    HCT 43.2 08/10/2022 04:15 AM    HCT 42.0 08/09/2022 06:47 AM    HCT 41.6 08/08/2022 05:04 PM    PLATELET 552 18/71/3599 04:15 AM    PLATELET 009 (H) 96/57/7766 06:47 AM    PLATELET 186 (H) 99/38/9605 05:04 PM       Recent Results (from the past 24 hour(s))   GLUCOSE, POC    Collection Time: 08/09/22  5:10 PM   Result Value Ref Range    Glucose (POC) 113 65 - 117 mg/dL    Performed by Aundrea Garcia    GLUCOSE, POC    Collection Time: 08/09/22 10:12 PM   Result Value Ref Range    Glucose (POC) 118 (H) 65 - 117 mg/dL    Performed by Marietta Interiano    METABOLIC PANEL, COMPREHENSIVE    Collection Time: 08/10/22  4:15 AM   Result Value Ref Range    Sodium 134 (L) 136 - 145 mmol/L    Potassium 4.2 3.5 - 5.1 mmol/L    Chloride 105 97 - 108 mmol/L    CO2 20 (L) 21 - 32 mmol/L    Anion gap 9 5 - 15 mmol/L    Glucose 136 (H) 65 - 100 mg/dL    BUN 17 6 - 20 MG/DL    Creatinine 0.70 0.55 - 1.02 MG/DL    BUN/Creatinine ratio 24 (H) 12 - 20      GFR est AA >60 >60 ml/min/1.73m2    GFR est non-AA >60 >60 ml/min/1.73m2    Calcium 9.2 8.5 - 10.1 MG/DL    Bilirubin, total 0.4 0.2 - 1.0 MG/DL    ALT (SGPT) 54 12 - 78 U/L    AST (SGOT) 37 15 - 37 U/L    Alk.  phosphatase 158 (H) 45 - 117 U/L    Protein, total 6.8 6.4 - 8.2 g/dL    Albumin 2.7 (L) 3.5 - 5.0 g/dL    Globulin 4.1 (H) 2.0 - 4.0 g/dL    A-G Ratio 0.7 (L) 1.1 - 2.2     CBC WITH AUTOMATED DIFF    Collection Time: 08/10/22  4:15 AM   Result Value Ref Range    WBC 8.4 3.6 - 11.0 K/uL    RBC 4.43 3.80 - 5.20 M/uL    HGB 13.9 11.5 - 16.0 g/dL    HCT 43.2 35.0 - 47.0 %    MCV 97.5 80.0 - 99.0 FL    MCH 31.4 26.0 - 34.0 PG    MCHC 32.2 30.0 - 36.5 g/dL    RDW 13.7 11.5 - 14.5 %    PLATELET 221 886 - 970 K/uL    MPV 8.2 (L) 8.9 - 12.9 FL NRBC 0.0 0  WBC    ABSOLUTE NRBC 0.00 0.00 - 0.01 K/uL    NEUTROPHILS 46 32 - 75 %    LYMPHOCYTES 45 12 - 49 %    MONOCYTES 4 (L) 5 - 13 %    EOSINOPHILS 3 0 - 7 %    BASOPHILS 1 0 - 1 %    IMMATURE GRANULOCYTES 1 (H) 0.0 - 0.5 %    ABS. NEUTROPHILS 4.0 1.8 - 8.0 K/UL    ABS. LYMPHOCYTES 3.8 (H) 0.8 - 3.5 K/UL    ABS. MONOCYTES 0.3 0.0 - 1.0 K/UL    ABS. EOSINOPHILS 0.3 0.0 - 0.4 K/UL    ABS. BASOPHILS 0.0 0.0 - 0.1 K/UL    ABS. IMM.  GRANS. 0.0 0.00 - 0.04 K/UL    DF AUTOMATED     GLUCOSE, POC    Collection Time: 08/10/22  6:52 AM   Result Value Ref Range    Glucose (POC) 127 (H) 65 - 117 mg/dL    Performed by Felix Denson, POC    Collection Time: 08/10/22 12:15 PM   Result Value Ref Range    Glucose (POC) 112 65 - 117 mg/dL    Performed by Nahomi Christianson

## 2022-08-10 NOTE — PROGRESS NOTES
Spiritual Care Assessment/Progress Note  [facility]      NAME: Amanda Marcano      MRN: [id number]  AGE: 42 y.o. SEX: female  Holiness Affiliation: Mosque   Language: English     8/10/2022     Total Time (in minutes): 5     Spiritual Assessment begun in [address] through conversation with:         [x]Patient        [x] Family    [] Friend(s)        Reason for Consult: [facility]     Spiritual beliefs: (Please include comment if needed)     [x] Identifies with a amy tradition:  Mosque       [] Supported by a amy community:            [] Claims no spiritual orientation:           [] Seeking spiritual identity:                [] Adheres to an individual form of spirituality:           [] Not able to assess:                           Identified resources for coping:      [x] Prayer                               [x] Music                  [] Guided Imagery     [x] Family/friends                 [] Pet visits     [] Devotional reading                         [] Unknown     [] Other:                                               Interventions offered during this visit: (See comments for more details)    Patient Interventions: Affirmation of amy, Communion [employer]), Initial/Spiritual assessment, patient floor, Prayer (actual), Prayer (assurance of)     Family/Friend(s):  Affirmation of amy, Communion (Mosque), Prayer (actual), Prayer (assurance of)     Plan of Care:     [x] Support spiritual and/or cultural needs    [] Support AMD and/or advance care planning process      [] Support grieving process   [] Coordinate Rites and/or Rituals    [] Coordination with community clergy   [] No spiritual needs identified at this time   [] Detailed Plan of Care below (See Comments)  [] Make referral to Music Therapy  [] Make referral to Pet Therapy     [] Make referral to Addiction services  [] Make referral to [facility]  [] Make referral to Spiritual Care Partner  [] No future visits requested        [] Contact Spiritual Care for further referrals     Comments: Mrs. Panfilo Grider, her  and  were in the room. She said her baby was born 15 days ago. She and her  declined communion today. Assured of prayer for the family.     DASHA Harris, RN, ACSW, LCSW   Page:  [phone]

## 2022-08-10 NOTE — PROGRESS NOTES
Gynecology Progress Note    Rachel Lance    Assesment & Plan: 41 yo  s/p CS on  admitted with worsening rash of unclear etiology. Rash: appreciate ID and medicine consults. Possible hypersensitivity reaction to yeast and possible overlying cellulitis of pannus.  -cont Diflucan per ID  -Benadryl prn itching  -antibiotics per ID, culture GNR, final culture pending  -appreciate wound care consult and wound care - silver foam in place    -rash stable/ slightly improved today. If worsens tomorrow, plan punch biopsy (call OR for supplies)    Breastfeeding: add Charlee Safer for nipple pain    Labs: AST slightly elevated today, ?med related. -AM labs    T2MD:   -appreciate diabetes management input - recs including of fastings >180.   -adequate control currently sliding scale      Subjective:  Patient reports that she's a little better today. Her itching is controlled with benadryl. The rash is less raised. The rash has not spread since yesterday. She denies fevers or chills. She's breastfeeding and complains of nipple pain - nipples feel rough and inflammed. Tolerating PO. Missing her other kids at home and wants to go home as soon as possible.          Orders/Charges: High    Vitals:  Visit Vitals  /71 (BP 1 Location: Left upper arm, BP Patient Position: At rest)   Pulse 90   Temp 98 °F (36.7 °C)   Resp 16   Ht 5' 2\" (1.575 m)   Wt 88.9 kg (196 lb)   SpO2 93%   BMI 35.85 kg/m²     Temp (24hrs), Av.9 °F (36.6 °C), Min:97.7 °F (36.5 °C), Max:98 °F (36.7 °C)      Last 24hr Input/Output:  No intake or output data in the 24 hours ending 22       Exam:  General: alert, cooperative, no distress, appears stated age     Lung: no resp distress     Abd: soft, no fundal tenderness     Skin: maculopapular rash overlying pannus, extending to inner thighs but within the inked margins, up abdomen, over bilateral breasts, left cheek, and over areas where she previously had adhesive (left upper abdomen insulin port, left arm glucometer). Silver foam in place over pannus. Incision intact without drainage or induration. Labs:   Lab Results   Component Value Date/Time    WBC 7.5 08/09/2022 06:47 AM    WBC 8.1 08/08/2022 05:04 PM    HGB 13.6 08/09/2022 06:47 AM    HGB 13.7 08/08/2022 05:04 PM    HCT 42.0 08/09/2022 06:47 AM    HCT 41.6 08/08/2022 05:04 PM    PLATELET 254 (H) 07/03/2226 06:47 AM    PLATELET 500 (H) 18/42/9149 05:04 PM       Recent Results (from the past 24 hour(s))   GLUCOSE, POC    Collection Time: 08/08/22 11:52 PM   Result Value Ref Range    Glucose (POC) 126 (H) 65 - 117 mg/dL    Performed by Aleida Thomasdle    HEMOGLOBIN A1C WITH EAG    Collection Time: 08/09/22  6:47 AM   Result Value Ref Range    Hemoglobin A1c 6.3 (H) 4.0 - 5.6 %    Est. average glucose 134 mg/dL   CRP, HIGH SENSITIVITY    Collection Time: 08/09/22  6:47 AM   Result Value Ref Range    CRP, High sensitivity 2.4 mg/L   CBC WITH AUTOMATED DIFF    Collection Time: 08/09/22  6:47 AM   Result Value Ref Range    WBC 7.5 3.6 - 11.0 K/uL    RBC 4.47 3.80 - 5.20 M/uL    HGB 13.6 11.5 - 16.0 g/dL    HCT 42.0 35.0 - 47.0 %    MCV 94.0 80.0 - 99.0 FL    MCH 30.4 26.0 - 34.0 PG    MCHC 32.4 30.0 - 36.5 g/dL    RDW 13.9 11.5 - 14.5 %    PLATELET 546 (H) 186 - 400 K/uL    MPV 8.2 (L) 8.9 - 12.9 FL    NRBC 0.0 0  WBC    ABSOLUTE NRBC 0.00 0.00 - 0.01 K/uL    NEUTROPHILS 48 32 - 75 %    LYMPHOCYTES 44 12 - 49 %    MONOCYTES 4 (L) 5 - 13 %    EOSINOPHILS 3 0 - 7 %    BASOPHILS 1 0 - 1 %    IMMATURE GRANULOCYTES 0 0.0 - 0.5 %    ABS. NEUTROPHILS 3.6 1.8 - 8.0 K/UL    ABS. LYMPHOCYTES 3.3 0.8 - 3.5 K/UL    ABS. MONOCYTES 0.3 0.0 - 1.0 K/UL    ABS. EOSINOPHILS 0.2 0.0 - 0.4 K/UL    ABS. BASOPHILS 0.0 0.0 - 0.1 K/UL    ABS. IMM.  GRANS. 0.0 0.00 - 0.04 K/UL    DF AUTOMATED     METABOLIC PANEL, COMPREHENSIVE    Collection Time: 08/09/22  6:47 AM   Result Value Ref Range    Sodium 136 136 - 145 mmol/L    Potassium 4.0 3.5 - 5.1 mmol/L    Chloride 104 97 - 108 mmol/L    CO2 26 21 - 32 mmol/L    Anion gap 6 5 - 15 mmol/L    Glucose 131 (H) 65 - 100 mg/dL    BUN 12 6 - 20 MG/DL    Creatinine 0.65 0.55 - 1.02 MG/DL    BUN/Creatinine ratio 18 12 - 20      GFR est AA >60 >60 ml/min/1.73m2    GFR est non-AA >60 >60 ml/min/1.73m2    Calcium 9.9 8.5 - 10.1 MG/DL    Bilirubin, total 0.5 0.2 - 1.0 MG/DL    ALT (SGPT) 54 12 - 78 U/L    AST (SGOT) 41 (H) 15 - 37 U/L    Alk. phosphatase 162 (H) 45 - 117 U/L    Protein, total 7.1 6.4 - 8.2 g/dL    Albumin 3.0 (L) 3.5 - 5.0 g/dL    Globulin 4.1 (H) 2.0 - 4.0 g/dL    A-G Ratio 0.7 (L) 1.1 - 2.2     MAGNESIUM    Collection Time: 08/09/22  6:47 AM   Result Value Ref Range    Magnesium 2.0 1.6 - 2.4 mg/dL   SAMPLES BEING HELD    Collection Time: 08/09/22  6:47 AM   Result Value Ref Range    SAMPLES BEING HELD 1sst     COMMENT        Add-on orders for these samples will be processed based on acceptable specimen integrity and analyte stability, which may vary by analyte.    GLUCOSE, POC    Collection Time: 08/09/22  8:36 AM   Result Value Ref Range    Glucose (POC) 131 (H) 65 - 117 mg/dL    Performed by Dana Calloway    GLUCOSE, POC    Collection Time: 08/09/22 12:14 PM   Result Value Ref Range    Glucose (POC) 105 65 - 117 mg/dL    Performed by [address], POC    Collection Time: 08/09/22  5:10 PM   Result Value Ref Range    Glucose (POC) 113 65 - 117 mg/dL    Performed by Nuzaht Anton

## 2022-08-10 NOTE — PROGRESS NOTES
ID Progress Note  8/10/2022    Subjective:     C/o less itching  Review of Systems:            Symptom Y/N Comments   Symptom Y/N Comments   Fever/Chills  n     Chest Pain n       Poor Appetite       Edema        Cough       Abdominal Pain        Sputum       Joint Pain        SOB/RUSS       Pruritis/Rash y       Nausea/vomit n      Tolerating PT/OT        Diarrhea       Tolerating Diet        Constipation       Other           Could NOT obtain due to:       Objective:     Vitals: Visit Vitals  /84 (BP 1 Location: Right upper arm, BP Patient Position: At rest)   Pulse 78   Temp 97.8 °F (36.6 °C)   Resp 16   Ht 5' 2\" (1.575 m)   Wt 88.9 kg (196 lb)   SpO2 98%   BMI 35.85 kg/m²          Tmax:  Temp (24hrs), Av.9 °F (36.6 °C), Min:97.7 °F (36.5 °C), Max:98 °F (36.7 °C)      PHYSICAL EXAM:  General: Obese,WD, WN. Alert, cooperative, no acute distress    EENT:  EOMI. Anicteric sclerae. MMM  Resp:  CTA bilaterally, no wheezing or rales. No accessory muscle use  CV:  Regular  rhythm,  No edema  GI:  Soft, Non distended, Non tender. +Bowel sounds  Neurologic:  Alert and oriented X 3, normal speech,   Psych:   Good insight. Not anxious nor agitated  Skin:  Diffuse maculopapular rashes over breasts, and skin folds.  No jaundice    Labs:   Lab Results   Component Value Date/Time    WBC 8.4 08/10/2022 04:15 AM    HGB 13.9 08/10/2022 04:15 AM    HCT 43.2 08/10/2022 04:15 AM    PLATELET 539  04:15 AM    MCV 97.5 08/10/2022 04:15 AM     Lab Results   Component Value Date/Time    Sodium 134 (L) 08/10/2022 04:15 AM    Potassium 4.2 08/10/2022 04:15 AM    Chloride 105 08/10/2022 04:15 AM    CO2 20 (L) 08/10/2022 04:15 AM    Anion gap 9 08/10/2022 04:15 AM    Glucose 136 (H) 08/10/2022 04:15 AM    BUN 17 08/10/2022 04:15 AM    Creatinine 0.70 08/10/2022 04:15 AM    BUN/Creatinine ratio 24 (H) 08/10/2022 04:15 AM    GFR est AA >60 08/10/2022 04:15 AM    GFR est non-AA >60 08/10/2022 04:15 AM    Calcium 9.2 08/10/2022 04:15 AM    Bilirubin, total 0.4 08/10/2022 04:15 AM    Alk. phosphatase 158 (H) 08/10/2022 04:15 AM    Protein, total 6.8 08/10/2022 04:15 AM    Albumin 2.7 (L) 08/10/2022 04:15 AM    Globulin 4.1 (H) 08/10/2022 04:15 AM    A-G Ratio 0.7 (L) 08/10/2022 04:15 AM    ALT (SGPT) 54 08/10/2022 04:15 AM         Cultures:   Results       Procedure Component Value Units Date/Time    CULTURE, TISSUE Myrle Loach STAIN [[id number]]  (Abnormal) Collected: 08/08/22 2008    Order Status: Completed Specimen: Skin Lesion Updated: 08/09/22 1303     Special Requests: NO SPECIAL REQUESTS        GRAM STAIN RARE WBCS SEEN         3+ GRAM NEGATIVE RODS               2+ GRAM POSITIVE COCCI IN PAIRS           Culture result: LIGHT GRAM NEGATIVE RODS               CHECKING FOR POSSIBLE HEAVY MIXED SKIN VICKEY ISOLATED                   Impression:   Recent C section  Peroneal candidiasis  Possible 'dermatitid' reaction to fungal infection        wound cx (8/8) GNR; incision is intact, healing, no open area. Pt reported some drainage, but she thinks it may be from between skin folds, malodorous.  GNR may be contaminant organism  Plan:   Continue with fluconazole, recommend to complete total 2 weeks, last dose 8/21   Check QTC ; ordered EKG  Wound care per wound care team  Symptomatic management       Above plan of care discussed and agreed with Dr. Meg Acotsa, NP

## 2022-08-10 NOTE — PROGRESS NOTES
Bedside and Verbal shift change report given to Sebastian Roman (oncoming nurse) by Allan Mooney (offgoing nurse). Report included the following information SBAR, Kardex, Intake/Output, MAR, Accordion, and Recent Results. 1500 - Spoke to [employer].  Ok to keep IV out

## 2022-08-11 VITALS
HEART RATE: 82 BPM | OXYGEN SATURATION: 96 % | HEIGHT: 62 IN | SYSTOLIC BLOOD PRESSURE: 118 MMHG | TEMPERATURE: 97.3 F | WEIGHT: 196 LBS | BODY MASS INDEX: 36.07 KG/M2 | DIASTOLIC BLOOD PRESSURE: 83 MMHG | RESPIRATION RATE: 18 BRPM

## 2022-08-11 LAB
BACTERIA SPEC CULT: ABNORMAL
GLUCOSE BLD STRIP.AUTO-MCNC: 123 MG/DL (ref 65–117)
GLUCOSE BLD STRIP.AUTO-MCNC: 123 MG/DL (ref 65–117)
GRAM STN SPEC: ABNORMAL
SERVICE CMNT-IMP: ABNORMAL

## 2022-08-11 PROCEDURE — 82962 GLUCOSE BLOOD TEST: CPT

## 2022-08-11 PROCEDURE — 74011250637 HC RX REV CODE- 250/637: Performed by: OBSTETRICS & GYNECOLOGY

## 2022-08-11 RX ORDER — FLUCONAZOLE 200 MG/1
400 TABLET ORAL EVERY 24 HOURS
Qty: 4 TABLET | Refills: 0 | Status: SHIPPED | OUTPATIENT
Start: 2022-08-11 | End: 2022-08-13

## 2022-08-11 RX ADMIN — DIPHENHYDRAMINE HYDROCHLORIDE 25 MG: 25 CAPSULE ORAL at 13:30

## 2022-08-11 RX ADMIN — Medication: at 08:09

## 2022-08-11 NOTE — PROGRESS NOTES
ID Progress Note  2022    Subjective:     \"I feel like it is getting betyter\"  Review of Systems:            Symptom Y/N Comments   Symptom Y/N Comments   Fever/Chills  n     Chest Pain n       Poor Appetite       Edema        Cough       Abdominal Pain        Sputum       Joint Pain        SOB/RUSS       Pruritis/Rash y       Nausea/vomit n      Tolerating PT/OT        Diarrhea       Tolerating Diet        Constipation       Other           Could NOT obtain due to:       Objective:     Vitals: Visit Vitals  BP 97/61 (BP 1 Location: Right arm)   Pulse 91   Temp 98.6 °F (37 °C)   Resp 18   Ht 5' 2\" (1.575 m)   Wt 88.9 kg (196 lb)   SpO2 96%   BMI 35.85 kg/m²          Tmax:  Temp (24hrs), Av.1 °F (36.7 °C), Min:97.6 °F (36.4 °C), Max:98.6 °F (37 °C)      PHYSICAL EXAM:  General: Obese,WD, WN. Alert, cooperative, no acute distress    EENT:  EOMI. Anicteric sclerae. MMM  Resp:  CTA bilaterally, no wheezing or rales. No accessory muscle use  CV:  Regular  rhythm,  No edema  GI:  Soft, Non distended, Non tender. +Bowel sounds  Neurologic:  Alert and oriented X 3, normal speech,   Psych:   Good insight. Not anxious nor agitated  Skin:  Diffuse maculopapular rashes over breasts, and skin folds.  No jaundice    Labs:   Lab Results   Component Value Date/Time    WBC 8.4 08/10/2022 04:15 AM    HGB 13.9 08/10/2022 04:15 AM    HCT 43.2 08/10/2022 04:15 AM    PLATELET 309  04:15 AM    MCV 97.5 08/10/2022 04:15 AM     Lab Results   Component Value Date/Time    Sodium 134 (L) 08/10/2022 04:15 AM    Potassium 4.2 08/10/2022 04:15 AM    Chloride 105 08/10/2022 04:15 AM    CO2 20 (L) 08/10/2022 04:15 AM    Anion gap 9 08/10/2022 04:15 AM    Glucose 136 (H) 08/10/2022 04:15 AM    BUN 17 08/10/2022 04:15 AM    Creatinine 0.70 08/10/2022 04:15 AM    BUN/Creatinine ratio 24 (H) 08/10/2022 04:15 AM    GFR est AA >60 08/10/2022 04:15 AM    GFR est non-AA >60 08/10/2022 04:15 AM    Calcium 9.2 08/10/2022 04:15 AM Bilirubin, total 0.4 08/10/2022 04:15 AM    Alk. phosphatase 158 (H) 08/10/2022 04:15 AM    Protein, total 6.8 08/10/2022 04:15 AM    Albumin 2.7 (L) 08/10/2022 04:15 AM    Globulin 4.1 (H) 08/10/2022 04:15 AM    A-G Ratio 0.7 (L) 08/10/2022 04:15 AM    ALT (SGPT) 54 08/10/2022 04:15 AM         Cultures:   Results       Procedure Component Value Units Date/Time    CULTURE, TISSUE Gita Ly STAIN [[id number]]  (Abnormal) Collected: 08/08/22 2008    Order Status: Completed Specimen: Skin Lesion Updated: 08/10/22 0942     Special Requests: NO SPECIAL REQUESTS        GRAM STAIN RARE WBCS SEEN         3+ GRAM NEGATIVE RODS               2+ GRAM POSITIVE COCCI IN PAIRS           Culture result: LIGHT GRAM NEGATIVE RODS               LIGHT POSSIBLE STAPHYLOCOCCUS AUREUS                  HEAVY MIXED SKIN VICKEY ISOLATED                  CHECKING FOR POSSIBLE ANAEROBE                   Impression:   Recent C section  Peroneal candidiasis  Possible 'dermatitid' reaction to fungal infection        wound cx (8/8) GNR/SA; incision is intact, healing, no open area. Pt reported some drainage, but she thinks it may be from between skin folds, malodorous. Consider as contaminant organism  Plan:   Continue with fluconazole, recommend to complete total 2 weeks, last dose 8/21          ms  Wound care per wound care team  Symptomatic management   Follow up with ID as need       Above plan of care discussed and agreed with Dr. Lenka Lou    ID team signing off. Please contact us with any questions.        Willi Ravi NP

## 2022-08-11 NOTE — PROGRESS NOTES
[facility] [facility]                                                                                          Hospitalist Progress Note  Jeff Mcdermott MD  Answering service: [phone] OR [phone] from in house phone        Date of Service:  2022  NAME:  Felice Welch  :  1985  MRN:  [id number]      Admission Summary:   Felice Welch is a 42 y.o. female with past medical history of type 2 diabetes mellitus, fatty liver, s/p recent  section with delivery of baby boy presented for admission today with diffuse rash. Patient is s/p  on 2022. Patient was discharged home and later developed rash at surgical wound site. Patient was seen for follow up on 2022 and adhesive/ TAWNYA dressing was removed. Patient was noted to have diffuse rash spreading from lower abdomen/ wound to groin. She was thought to have cutaneous yeast infection. Patient reports that entire areas under abdominal pannus is wet but wound notedly is healing without apparent drainage. She was prescribed Medrol dose pack and Fluconazole po. She was seen on 2022 at [facility] urgent care center with as rash spread from lower abdomen/ wound site/ to groin, upper thigh, breast, left side of face, left side of arm with associated mouth sore. On arrival at the hospital for admission to the OB/GYN service, patient was started on Benadryl 25 mg po q 6 hours and Diflucan 400 mg po. Patient has been seen by infectious disease specialist in consultation with recommendations to increase dose of Diflucan, consider for punch biopsy, and continue supportive cares. Patient complains of itchy (generalized) but slightly improved with Benadryl. She also notes that she is now having rash/ reaction at any site where she had adhesive present, such as continuous glucose monitor on left arm. Patient already removed the CGM adhesive.   She also notes that her right thigh colored tattoo is becoming indurated for the first time. She reports no history of autoimmune, similar reaction or allergies in the past.  On entry to the room, patient was breast feeding and female nurse chaperon was present during my entire patient evaluation       Interval history / Subjective:   Pt reports improvement in the rash. No fevers. No pain. Itching improved. Assessment & Plan:     Skin candidiasis with hypersensitivity ID reaction   - Continue fluconazole, per ID  - QTC ok 425  - Could consider oral steroids if worsening, but would discuss with dermatology   - Since rash much improved, would have pt follow up with outpatient dermatology. She has seen someone in the past, and will ask her PCP for this information   - continue benadryl PRN for itching    Type 2 diabetes - A1c 6.3  - Defer to PCP for tx     Hypercalcemia - on one BMP, resolved since. Given improvement in rash, and no further diagnostics planned will sign off. Could consider skin biopsy with general surgery, however could also defer to o/p derm who will be following the patient. Please re-consult us if needed. If rash worsens would strongly suggest transfer to tertiary care center with dermatology assistance. Hospital Problems  Never Reviewed            Codes Class Noted POA    Cellulitis ICD-10-CM: L03.90  ICD-9-CM: 682.9  8/8/2022 Unknown             Review of Systems:   A comprehensive review of systems was negative except for that written in the HPI. Vital Signs:    Last 24hrs VS reviewed since prior progress note.  Most recent are:  Visit Vitals  /83 (BP 1 Location: Right arm, BP Patient Position: At rest)   Pulse 82   Temp 97.3 °F (36.3 °C)   Resp 18   Ht 5' 2\" (1.575 m)   Wt 88.9 kg (196 lb)   SpO2 96%   BMI 35.85 kg/m²       No intake or output data in the 24 hours ending 08/11/22 2819     Physical Examination:     I had a face to face encounter with this patient and independently examined them on 8/11/2022 as outlined below:          Constitutional:  No acute distress, cooperative, pleasant    ENT:  Oral mucosa moist, oropharynx benign. Small erythematous lesion on the hard palate    Resp:  CTA bilaterally. No wheezing/rhonchi/rales. No accessory muscle use. CV:  Regular rhythm, normal rate, no murmurs, gallops, rubs    GI:  Soft, non distended, non tender. normoactive bowel sounds, no hepatosplenomegaly. C/s incision c/d/I     Musculoskeletal:  No edema, warm, 2+ pulses throughout    Neurologic:  Moves all extremities. AAOx3, CN II-XII reviewed  Skin: areas of erythema over bilateral groin with some erythematous macules and plaques around the breasts, chest, and arms. Minimal sloughing             Data Review:    Review and/or order of clinical lab test  Review and/or order of tests in the radiology section of CPT  Review and/or order of tests in the medicine section of CPT      Labs:     Recent Labs     08/10/22  0415 08/09/22  0647   WBC 8.4 7.5   HGB 13.9 13.6   HCT 43.2 42.0    408*     Recent Labs     08/10/22  0415 08/09/22  0647 08/08/22  1704   * 136 137   K 4.2 4.0 4.0    104 102   CO2 20* 26 28   BUN 17 12 13   CREA 0.70 0.65 0.67   * 131* 124*   CA 9.2 9.9 10.9*   MG  --  2.0  --      Recent Labs     08/10/22  0415 08/09/22  0647 08/08/22  1704   ALT 54 54 45   * 162* 169*   TBILI 0.4 0.5 0.4   TP 6.8 7.1 7.3   ALB 2.7* 3.0* 3.0*   GLOB 4.1* 4.1* 4.3*     No results for input(s): INR, PTP, APTT, INREXT in the last 72 hours. No results for input(s): FE, TIBC, PSAT, FERR in the last 72 hours. No results found for: FOL, RBCF   No results for input(s): PH, PCO2, PO2 in the last 72 hours. No results for input(s): CPK, CKNDX, TROIQ in the last 72 hours.     No lab exists for component: CPKMB  No results found for: CHOL, CHOLX, CHLST, CHOLV, HDL, HDLP, LDL, LDLC, DLDLP, TGLX, TRIGL, TRIGP, CHHD, CHHDX  Lab Results   Component Value Date/Time    Glucose (POC) 123 (H) 08/11/2022 07:01 AM    Glucose (POC) 129 (H) 08/10/2022 10:02 PM    Glucose (POC) 101 08/10/2022 04:41 PM    Glucose (POC) 112 08/10/2022 12:15 PM    Glucose (POC) 127 (H) 08/10/2022 06:52 AM     No results found for: COLOR, APPRN, SPGRU, REFSG, JANICE, PROTU, GLUCU, KETU, BILU, UROU, HAFSA, LEUKU, GLUKE, EPSU, BACTU, WBCU, RBCU, CASTS, UCRY      Medications Reviewed:     Current Facility-Administered Medications   Medication Dose Route Frequency    glucose chewable tablet 16 g  4 Tablet Oral PRN    glucagon (GLUCAGEN) injection 1 mg  1 mg IntraMUSCular PRN    dextrose 10 % infusion 0-250 mL  0-250 mL IntraVENous PRN    insulin lispro (HUMALOG) injection   SubCUTAneous AC&HS    acetaminophen (TYLENOL) tablet 650 mg  650 mg Oral Q4H PRN    Nipple Ointment ([employer])BSR   Topical TID    diphenhydrAMINE (BENADRYL) capsule 25 mg  25 mg Oral Q6H PRN    fluconazole (DIFLUCAN) tablet 400 mg  400 mg Oral Q24H     ______________________________________________________________________  EXPECTED LENGTH OF STAY: 2d 12h  ACTUAL LENGTH OF STAY:          3                 Mikey Cueva MD

## 2022-08-11 NOTE — DISCHARGE INSTRUCTIONS
Postpartum: Care Instructions  Overview  After childbirth (postpartum period), your body goes through many changes. Some of these changes happen over several weeks. In the hours after delivery, your body will begin to recover from childbirth while it prepares to breastfeed your . You may feel emotional during this time. Your hormones can shift your mood without warning for no clear reason. In the first couple of weeks after childbirth, it's common to have emotions that change from happy to sad. You may find it hard to sleep. You may cry a lot. This is called the \"baby blues. \" These overwhelming emotions often go away within a couple of days or weeks. But it's important to discuss your feelings with your doctor. It's easy to get too tired and overwhelmed during the first weeks after childbirth. Don't try to do too much. Get rest whenever you can, accept help from others, and eat well and drink plenty of fluids. In the first couple of weeks after you give birth, your doctor or midwife may want to check in with you and make a plan for any follow-up care you may need. You will likely have a complete postpartum visit in the first 3 months after delivery. At that time, your doctor or midwife will check on your recovery from childbirth and see how you're doing with your emotions. You may also discuss your concerns or questions. Follow-up care is a key part of your treatment and safety. Be sure to make and go to all appointments, and call your doctor if you are having problems. It's also a good idea to know your test results and keep a list of the medicines you take. How can you care for yourself at home? Sleep or rest when your baby sleeps. Get help with household chores from family or friends, if you can. Don't try to do it all yourself. If you have hemorrhoids or swelling or pain around the opening of your vagina, try using cold and heat.  You can put ice or a cold pack on the area for 10 to 20 minutes at a time. Put a thin cloth between the ice and your skin. Also try sitting in a few inches of warm water (sitz bath) 3 times a day and after bowel movements. Take pain medicines exactly as directed. If the doctor gave you a prescription medicine for pain, take it as prescribed. If you are not taking a prescription pain medicine, ask your doctor if you can take an over-the-counter medicine. Eat more fiber to avoid constipation. Include foods such as whole-grain breads and cereals, raw vegetables, raw and dried fruits, and beans. Drink plenty of fluids. If you have kidney, heart, or liver disease and have to limit fluids, talk with your doctor before you increase the amount of fluids you drink. Do not rinse inside your vagina with fluids (douche). If you have stitches, keep the area clean by pouring or spraying warm water over the area outside your vagina and anus after you use the toilet. Keep a list of questions to ask your doctor or midwife. Your questions might be about:  Changes in your breasts, such as lumps or soreness. When to expect your menstrual period to start again. What form of birth control is best for you. Weight you have put on during the pregnancy. Exercise options. What foods and drinks are best for you, especially if you are breastfeeding. Problems you might be having with breastfeeding. When you can have sex. You may want to talk about lubricants for your vagina. Any feelings of sadness or restlessness that you are having. When should you call for help? Share this information with your partner, family, or a friend. They can help you watch for warning signs. Call 911  anytime you think you may need emergency care. For example, call if:    You have thoughts of harming yourself, your baby, or another person. You passed out (lost consciousness). You have chest pain, are short of breath, or cough up blood. You have a seizure.    Call your doctor now or seek immediate medical care if:    You have signs of hemorrhage (too much bleeding), such as:  Heavy vaginal bleeding. This means that you are soaking through one or more pads in an hour. Or you pass blood clots bigger than an egg. Feeling dizzy or lightheaded, or you feel like you may faint. Feeling so tired or weak that you cannot do your usual activities. A fast or irregular heartbeat. New or worse belly pain. You have signs of infection, such as:  A fever. Vaginal discharge that smells bad. New or worse belly pain. You have symptoms of a blood clot in your leg (called a deep vein thrombosis), such as:  Pain in the calf, back of the knee, thigh, or groin. Redness and swelling in your leg or groin. You have signs of preeclampsia, such as:  Sudden swelling of your face, hands, or feet. New vision problems (such as dimness, blurring, or seeing spots). A severe headache. Watch closely for changes in your health, and be sure to contact your doctor if:    Your vaginal bleeding isn't decreasing. You feel sad, anxious, or hopeless for more than a few days. You are having problems with your breasts or breastfeeding. Where can you learn more? Go to http://www.gray.com/  Enter S728 in the search box to learn more about \"Postpartum: Care Instructions. \"  Current as of: June 16, 2021               Content Version: 13.2  © 1027-3246 [employer]. Care instructions adapted under license by [employer] (which disclaims liability or warranty for this information). If you have questions about a medical condition or this instruction, always ask your healthcare professional. Dominique Granados any warranty or liability for your use of this information.

## 2022-08-11 NOTE — PROGRESS NOTES
Gynecology Progress Note    Rachel Bradford    Assesment:   43 yo  s/p CS on  admitted with worsening rash of unclear etiology. Rash: appreciate ID and medicine consults. Possible hypersensitivity reaction to yeast and possible overlying cellulitis of pannus.  -cont Diflucan per ID  -Benadryl prn itching  -antibiotics per ID, culture GNR, final culture pending  -appreciate wound care consult and wound care - silver foam in place     -rash stable/ continuing to improve today.   -Per IM, ok for discharge with derm followup as outpatient   Breastfeeding: add Claryce Bleak for nipple pain     Labs: AST slightly elevated today, ?med related. -AM labs     T2MD:  -appreciate diabetes management input - recs including of fastings >180.  -adequate control currently sliding scale        Subjective:  Patient reports that she's better today. Her itching is controlled with benadryl. The rash is less raised. The rash has not spread and appears to be flatter. She denies fevers or chills.       Orders/Charges: High    Vitals:  Visit Vitals  /83 (BP 1 Location: Right arm, BP Patient Position: At rest)   Pulse 82   Temp 97.3 °F (36.3 °C)   Resp 18   Ht 5' 2\" (1.575 m)   Wt 88.9 kg (196 lb)   SpO2 96%   BMI 35.85 kg/m²     Temp (24hrs), Av °F (36.7 °C), Min:97.3 °F (36.3 °C), Max:98.6 °F (37 °C)      Last 24hr Input/Output:  No intake or output data in the 24 hours ending 22 1249       Exam:  General: alert, cooperative, no distress, appears stated age     Lung: clear to auscultation bilaterally     Heart: regular rate and rhythm, S1, S2 normal, no murmur, click, rub or gallop     Abdomen: abdomen is soft without significant tenderness, masses, organomegaly or guarding; skin rash is noted with silver dressings in place over rash; rash is over mons, inner thighs, abdomen and patchy areas over breasts; rash is erythematous though less raised and flatter than prior     Extremities: extremities normal, atraumatic, no cyanosis or edema      Labs:   Lab Results   Component Value Date/Time    WBC 8.4 08/10/2022 04:15 AM    WBC 7.5 08/09/2022 06:47 AM    WBC 8.1 08/08/2022 05:04 PM    HGB 13.9 08/10/2022 04:15 AM    HGB 13.6 08/09/2022 06:47 AM    HGB 13.7 08/08/2022 05:04 PM    HCT 43.2 08/10/2022 04:15 AM    HCT 42.0 08/09/2022 06:47 AM    HCT 41.6 08/08/2022 05:04 PM    PLATELET 496 91/77/4892 04:15 AM    PLATELET 836 (H) 84/11/6135 06:47 AM    PLATELET 077 (H) 09/59/1953 05:04 PM       Recent Results (from the past 24 hour(s))   EKG, 12 LEAD, INITIAL    Collection Time: 08/10/22  2:51 PM   Result Value Ref Range    Ventricular Rate 83 BPM    Atrial Rate 83 BPM    P-R Interval 158 ms    QRS Duration 86 ms    Q-T Interval 360 ms    QTC Calculation (Bezet) 423 ms    Calculated P Axis 39 degrees    Calculated R Axis 5 degrees    Calculated T Axis 22 degrees    Diagnosis       Normal sinus rhythm  Normal ECG  No previous ECGs available  Confirmed by Tai Cifuentes ([id number]) on 8/10/2022 4:42:10 PM     GLUCOSE, POC    Collection Time: 08/10/22  4:41 PM   Result Value Ref Range    Glucose (POC) 101 65 - 117 mg/dL    Performed by [address] [place] [id number], POC    Collection Time: 08/10/22 10:02 PM   Result Value Ref Range    Glucose (POC) 129 (H) 65 - 117 mg/dL    Performed by Vitor Delgado    GLUCOSE, POC    Collection Time: 08/11/22  7:01 AM   Result Value Ref Range    Glucose (POC) 123 (H) 65 - 117 mg/dL    Performed by Zabrina Solis    GLUCOSE, POC    Collection Time: 08/11/22 11:31 AM   Result Value Ref Range    Glucose (POC) 123 (H) 65 - 117 mg/dL    Performed by Bladimir Granado

## 2022-08-11 NOTE — PROGRESS NOTES
Bedside and Verbal shift change report given to [employer] (oncoming nurse) by Arcelia Corley (offgoing nurse). Report included the following information SBAR, Kardex, Intake/Output, MAR, and Accordion. I have reviewed discharge instructions with the patient. The patient verbalized understanding. Patient will schedule follow up appointments.

## 2022-08-11 NOTE — DISCHARGE SUMMARY
Obstetrical Discharge Summary     Name: Amador Traylor MRN: [id number]  SSN: [id number]    YOB: 1985  Age: 42 y.o. Sex: female      Admit Date: 2022    Discharge Date: 2022     Admitting Physician: Laura eBllo MD     Attending Physician:  Claudean Kindler, MD     Admission Diagnoses: Cellulitis [L03.90]    Discharge Diagnoses: This patient has no babies on file. Additional Diagnoses:   Hospital Problems  Never Reviewed            Codes Class Noted POA    Cellulitis ICD-10-CM: L03.90  ICD-9-CM: 682.9  2022 Unknown        No results found for: RUBELLALEE, [facility] THIEF RVR FALL Course: 43 yo  s/p CS on  admitted  with worsening rash of unclear etiology. ID and IM consultations were placed. Possible hypersensitivity reaction to yeast and possible overlying cellulitis of pannus. Patient started on high dose diflucan, benadryl for itchiness. Wound care was consulted and silver foam was placed over affected areas. Patient continued to improve and was discharged on . She will follow up with dermatology. Patient Instructions:   Current Discharge Medication List        START taking these medications    Details   fluconazole (DIFLUCAN) 200 mg tablet Take 2 Tablets by mouth every twenty-four (24) hours for 2 doses. FDA advises cautious prescribing of oral fluconazole in pregnancy. Qty: 4 Tablet, Refills: 0           CONTINUE these medications which have NOT CHANGED    Details   PNV no.24-iron-folic acid-dha -643 mg-mcg-mg Cmpk Take  by mouth. insulin lispro (HUMALOG) 100 unit/mL injection 32 Units by SubCUTAneous route. Breakfast 32 units  Lunch 38 units  Dinner 36 units   Indications: DIABETES MELLITUS      insulin detemir (LEVEMIR) 100 unit/mL injection 32 Units by SubCUTAneous route nightly.  32 units at breakfast   Indications: TYPE 2 DIABETES MELLITUS             Disposition at Discharge: Home or self care    Condition at Discharge: Stable    Reference my discharge instructions. Follow-up Appointments   Procedures    FOLLOW UP VISIT Appointment in: One Week     Standing Status:   Standing     Number of Occurrences:   1     Order Specific Question:   Appointment in     Answer:    One Week        Signed By:  Eric Marcelino MD     August 11, 2022

## 2022-08-11 NOTE — PROGRESS NOTES
Bedside and Verbal shift change report given to MIKE Grande RN/ PATEL Menon RN (oncoming nurse) by Michelle Francisco. Chris Pitt RN (offgoing nurse). Report included the following information SBAR, Kardex, Intake/Output, MAR, and Recent Results.